# Patient Record
Sex: FEMALE | Race: WHITE | NOT HISPANIC OR LATINO | Employment: OTHER | URBAN - NONMETROPOLITAN AREA
[De-identification: names, ages, dates, MRNs, and addresses within clinical notes are randomized per-mention and may not be internally consistent; named-entity substitution may affect disease eponyms.]

---

## 2022-09-21 ENCOUNTER — HOSPITAL ENCOUNTER (OUTPATIENT)
Facility: HOSPITAL | Age: 37
Discharge: HOME | End: 2022-10-10
Attending: INTERNAL MEDICINE | Admitting: INTERNAL MEDICINE

## 2022-09-21 PROCEDURE — 87102 FUNGUS ISOLATION CULTURE: CPT | Performed by: INTERNAL MEDICINE

## 2022-09-21 RX ORDER — ACETAMINOPHEN 650 MG/1
650 SUPPOSITORY RECTAL EVERY 4 HOURS PRN
Status: DISCONTINUED | OUTPATIENT
Start: 2022-09-21 | End: 2022-10-10 | Stop reason: HOSPADM

## 2022-09-21 RX ORDER — SODIUM CHLORIDE 0.9 % (FLUSH) 0.9 %
10 SYRINGE (ML) INJECTION EVERY 12 HOURS SCHEDULED
Status: DISCONTINUED | OUTPATIENT
Start: 2022-09-21 | End: 2022-10-10 | Stop reason: HOSPADM

## 2022-09-21 RX ORDER — DIVALPROEX SODIUM 500 MG/1
2500 TABLET, EXTENDED RELEASE ORAL NIGHTLY
Status: DISCONTINUED | OUTPATIENT
Start: 2022-09-21 | End: 2022-10-10 | Stop reason: HOSPADM

## 2022-09-21 RX ORDER — ONDANSETRON 4 MG/1
4 TABLET, FILM COATED ORAL EVERY 6 HOURS PRN
Status: DISCONTINUED | OUTPATIENT
Start: 2022-09-21 | End: 2022-10-10 | Stop reason: HOSPADM

## 2022-09-21 RX ORDER — BENZTROPINE MESYLATE 0.5 MG/1
1 TABLET ORAL 3 TIMES DAILY
Status: DISCONTINUED | OUTPATIENT
Start: 2022-09-21 | End: 2022-10-10 | Stop reason: HOSPADM

## 2022-09-21 RX ORDER — DOCUSATE SODIUM 100 MG/1
100 CAPSULE, LIQUID FILLED ORAL 2 TIMES DAILY
Status: DISCONTINUED | OUTPATIENT
Start: 2022-09-21 | End: 2022-10-10 | Stop reason: HOSPADM

## 2022-09-21 RX ORDER — BENZONATATE 100 MG/1
100 CAPSULE ORAL 3 TIMES DAILY PRN
Status: DISCONTINUED | OUTPATIENT
Start: 2022-09-21 | End: 2022-10-10 | Stop reason: HOSPADM

## 2022-09-21 RX ORDER — PANTOPRAZOLE SODIUM 40 MG/1
40 TABLET, DELAYED RELEASE ORAL DAILY
Status: DISCONTINUED | OUTPATIENT
Start: 2022-09-22 | End: 2022-10-10 | Stop reason: HOSPADM

## 2022-09-21 RX ORDER — GUAIFENESIN 600 MG/1
1200 TABLET, EXTENDED RELEASE ORAL 2 TIMES DAILY PRN
Status: DISCONTINUED | OUTPATIENT
Start: 2022-09-21 | End: 2022-10-10 | Stop reason: HOSPADM

## 2022-09-21 RX ORDER — CLOZAPINE 25 MG/1
100 TABLET ORAL DAILY
Status: DISCONTINUED | OUTPATIENT
Start: 2022-09-22 | End: 2022-09-22

## 2022-09-21 RX ORDER — MELATONIN
2000 DAILY
Status: DISCONTINUED | OUTPATIENT
Start: 2022-09-22 | End: 2022-10-10 | Stop reason: HOSPADM

## 2022-09-21 RX ORDER — FUROSEMIDE 40 MG/1
40 TABLET ORAL DAILY
Status: DISCONTINUED | OUTPATIENT
Start: 2022-09-22 | End: 2022-10-10 | Stop reason: HOSPADM

## 2022-09-21 RX ORDER — SACCHAROMYCES BOULARDII 250 MG
250 CAPSULE ORAL 2 TIMES DAILY
Status: DISCONTINUED | OUTPATIENT
Start: 2022-09-21 | End: 2022-10-10 | Stop reason: HOSPADM

## 2022-09-21 RX ORDER — ENOXAPARIN SODIUM 100 MG/ML
40 INJECTION SUBCUTANEOUS EVERY 12 HOURS
Status: DISCONTINUED | OUTPATIENT
Start: 2022-09-22 | End: 2022-10-10 | Stop reason: HOSPADM

## 2022-09-21 RX ORDER — SODIUM CHLORIDE 0.9 % (FLUSH) 0.9 %
10 SYRINGE (ML) INJECTION AS NEEDED
Status: DISCONTINUED | OUTPATIENT
Start: 2022-09-21 | End: 2022-10-10 | Stop reason: HOSPADM

## 2022-09-21 RX ORDER — IPRATROPIUM BROMIDE AND ALBUTEROL SULFATE 2.5; .5 MG/3ML; MG/3ML
3 SOLUTION RESPIRATORY (INHALATION)
Status: DISCONTINUED | OUTPATIENT
Start: 2022-09-21 | End: 2022-10-08

## 2022-09-21 RX ORDER — ACETAMINOPHEN 325 MG/1
650 TABLET ORAL EVERY 4 HOURS PRN
Status: DISCONTINUED | OUTPATIENT
Start: 2022-09-21 | End: 2022-10-10 | Stop reason: HOSPADM

## 2022-09-21 RX ORDER — ONDANSETRON 2 MG/ML
4 INJECTION INTRAMUSCULAR; INTRAVENOUS EVERY 6 HOURS PRN
Status: DISCONTINUED | OUTPATIENT
Start: 2022-09-21 | End: 2022-10-10 | Stop reason: HOSPADM

## 2022-09-22 LAB
ALBUMIN SERPL-MCNC: 3.1 G/DL (ref 3.5–5.2)
ALBUMIN/GLOB SERPL: 1 G/DL
ALP SERPL-CCNC: 52 U/L (ref 39–117)
ALT SERPL W P-5'-P-CCNC: 10 U/L (ref 1–33)
ANION GAP SERPL CALCULATED.3IONS-SCNC: 7 MMOL/L (ref 5–15)
AST SERPL-CCNC: 12 U/L (ref 1–32)
BASOPHILS # BLD AUTO: 0.01 10*3/MM3 (ref 0–0.2)
BASOPHILS NFR BLD AUTO: 0.1 % (ref 0–1.5)
BILIRUB SERPL-MCNC: 0.3 MG/DL (ref 0–1.2)
BUN SERPL-MCNC: 13 MG/DL (ref 6–20)
BUN/CREAT SERPL: 23.6 (ref 7–25)
CALCIUM SPEC-SCNC: 8.8 MG/DL (ref 8.6–10.5)
CHLORIDE SERPL-SCNC: 99 MMOL/L (ref 98–107)
CO2 SERPL-SCNC: 36 MMOL/L (ref 22–29)
CREAT SERPL-MCNC: 0.55 MG/DL (ref 0.57–1)
DEPRECATED RDW RBC AUTO: 49.2 FL (ref 37–54)
EGFRCR SERPLBLD CKD-EPI 2021: 122.8 ML/MIN/1.73
EOSINOPHIL # BLD AUTO: 0.12 10*3/MM3 (ref 0–0.4)
EOSINOPHIL NFR BLD AUTO: 1.7 % (ref 0.3–6.2)
ERYTHROCYTE [DISTWIDTH] IN BLOOD BY AUTOMATED COUNT: 13.7 % (ref 12.3–15.4)
GLOBULIN UR ELPH-MCNC: 3 GM/DL
GLUCOSE SERPL-MCNC: 107 MG/DL (ref 65–99)
HCT VFR BLD AUTO: 40.8 % (ref 34–46.6)
HGB BLD-MCNC: 12.5 G/DL (ref 12–15.9)
IMM GRANULOCYTES # BLD AUTO: 0.03 10*3/MM3 (ref 0–0.05)
IMM GRANULOCYTES NFR BLD AUTO: 0.4 % (ref 0–0.5)
LYMPHOCYTES # BLD AUTO: 2.02 10*3/MM3 (ref 0.7–3.1)
LYMPHOCYTES NFR BLD AUTO: 28.4 % (ref 19.6–45.3)
MCH RBC QN AUTO: 29.9 PG (ref 26.6–33)
MCHC RBC AUTO-ENTMCNC: 30.6 G/DL (ref 31.5–35.7)
MCV RBC AUTO: 97.6 FL (ref 79–97)
MONOCYTES # BLD AUTO: 1.02 10*3/MM3 (ref 0.1–0.9)
MONOCYTES NFR BLD AUTO: 14.3 % (ref 5–12)
NEUTROPHILS NFR BLD AUTO: 3.92 10*3/MM3 (ref 1.7–7)
NEUTROPHILS NFR BLD AUTO: 55.1 % (ref 42.7–76)
NRBC BLD AUTO-RTO: 0 /100 WBC (ref 0–0.2)
PLATELET # BLD AUTO: 174 10*3/MM3 (ref 140–450)
PMV BLD AUTO: 9.5 FL (ref 6–12)
POTASSIUM SERPL-SCNC: 4.1 MMOL/L (ref 3.5–5.2)
PREALB SERPL-MCNC: 18.8 MG/DL (ref 20–40)
PROT SERPL-MCNC: 6.1 G/DL (ref 6–8.5)
RBC # BLD AUTO: 4.18 10*6/MM3 (ref 3.77–5.28)
SODIUM SERPL-SCNC: 142 MMOL/L (ref 136–145)
WBC NRBC COR # BLD: 7.12 10*3/MM3 (ref 3.4–10.8)

## 2022-09-22 PROCEDURE — 80053 COMPREHEN METABOLIC PANEL: CPT | Performed by: INTERNAL MEDICINE

## 2022-09-22 PROCEDURE — 97162 PT EVAL MOD COMPLEX 30 MIN: CPT | Performed by: PHYSICAL THERAPIST

## 2022-09-22 PROCEDURE — 97166 OT EVAL MOD COMPLEX 45 MIN: CPT | Performed by: OCCUPATIONAL THERAPIST

## 2022-09-22 PROCEDURE — 85025 COMPLETE CBC W/AUTO DIFF WBC: CPT | Performed by: INTERNAL MEDICINE

## 2022-09-22 PROCEDURE — 25010000002 ENOXAPARIN PER 10 MG: Performed by: INTERNAL MEDICINE

## 2022-09-22 PROCEDURE — 84134 ASSAY OF PREALBUMIN: CPT | Performed by: INTERNAL MEDICINE

## 2022-09-22 PROCEDURE — 92610 EVALUATE SWALLOWING FUNCTION: CPT

## 2022-09-22 RX ORDER — CLOZAPINE 25 MG/1
100 TABLET ORAL DAILY
Status: DISCONTINUED | OUTPATIENT
Start: 2022-09-22 | End: 2022-09-22

## 2022-09-22 RX ORDER — CLOZAPINE 25 MG/1
100 TABLET ORAL DAILY
Status: DISCONTINUED | OUTPATIENT
Start: 2022-09-22 | End: 2022-10-10 | Stop reason: HOSPADM

## 2022-09-23 PROCEDURE — 92523 SPEECH SOUND LANG COMPREHEN: CPT

## 2022-09-23 PROCEDURE — 97110 THERAPEUTIC EXERCISES: CPT

## 2022-09-23 PROCEDURE — 25010000002 ENOXAPARIN PER 10 MG: Performed by: INTERNAL MEDICINE

## 2022-09-23 PROCEDURE — 97116 GAIT TRAINING THERAPY: CPT

## 2022-09-23 PROCEDURE — 97530 THERAPEUTIC ACTIVITIES: CPT

## 2022-09-23 PROCEDURE — 97535 SELF CARE MNGMENT TRAINING: CPT

## 2022-09-23 RX ORDER — NICOTINE 21 MG/24HR
1 PATCH, TRANSDERMAL 24 HOURS TRANSDERMAL
Status: DISCONTINUED | OUTPATIENT
Start: 2022-09-23 | End: 2022-10-10 | Stop reason: HOSPADM

## 2022-09-24 PROCEDURE — 97116 GAIT TRAINING THERAPY: CPT

## 2022-09-24 PROCEDURE — 25010000002 ENOXAPARIN PER 10 MG: Performed by: INTERNAL MEDICINE

## 2022-09-25 PROCEDURE — 25010000002 ENOXAPARIN PER 10 MG: Performed by: INTERNAL MEDICINE

## 2022-09-26 LAB
ANION GAP SERPL CALCULATED.3IONS-SCNC: 6 MMOL/L (ref 5–15)
BUN SERPL-MCNC: 13 MG/DL (ref 6–20)
BUN/CREAT SERPL: 22.8 (ref 7–25)
CALCIUM SPEC-SCNC: 8.6 MG/DL (ref 8.6–10.5)
CHLORIDE SERPL-SCNC: 101 MMOL/L (ref 98–107)
CO2 SERPL-SCNC: 35 MMOL/L (ref 22–29)
CREAT SERPL-MCNC: 0.57 MG/DL (ref 0.57–1)
DEPRECATED RDW RBC AUTO: 48.7 FL (ref 37–54)
EGFRCR SERPLBLD CKD-EPI 2021: 121.7 ML/MIN/1.73
ERYTHROCYTE [DISTWIDTH] IN BLOOD BY AUTOMATED COUNT: 13.2 % (ref 12.3–15.4)
GLUCOSE SERPL-MCNC: 98 MG/DL (ref 65–99)
HCT VFR BLD AUTO: 40.5 % (ref 34–46.6)
HGB BLD-MCNC: 12.2 G/DL (ref 12–15.9)
MCH RBC QN AUTO: 30.2 PG (ref 26.6–33)
MCHC RBC AUTO-ENTMCNC: 30.1 G/DL (ref 31.5–35.7)
MCV RBC AUTO: 100.2 FL (ref 79–97)
PLATELET # BLD AUTO: 181 10*3/MM3 (ref 140–450)
PMV BLD AUTO: 8.9 FL (ref 6–12)
POTASSIUM SERPL-SCNC: 3.9 MMOL/L (ref 3.5–5.2)
RBC # BLD AUTO: 4.04 10*6/MM3 (ref 3.77–5.28)
SODIUM SERPL-SCNC: 142 MMOL/L (ref 136–145)
WBC NRBC COR # BLD: 5.7 10*3/MM3 (ref 3.4–10.8)

## 2022-09-26 PROCEDURE — 97110 THERAPEUTIC EXERCISES: CPT

## 2022-09-26 PROCEDURE — 25010000002 ENOXAPARIN PER 10 MG: Performed by: INTERNAL MEDICINE

## 2022-09-26 PROCEDURE — 97116 GAIT TRAINING THERAPY: CPT

## 2022-09-26 PROCEDURE — 80048 BASIC METABOLIC PNL TOTAL CA: CPT | Performed by: NURSE PRACTITIONER

## 2022-09-26 PROCEDURE — 85027 COMPLETE CBC AUTOMATED: CPT | Performed by: NURSE PRACTITIONER

## 2022-09-26 PROCEDURE — 97535 SELF CARE MNGMENT TRAINING: CPT

## 2022-09-27 PROCEDURE — 97116 GAIT TRAINING THERAPY: CPT

## 2022-09-27 PROCEDURE — 97535 SELF CARE MNGMENT TRAINING: CPT

## 2022-09-27 PROCEDURE — 97110 THERAPEUTIC EXERCISES: CPT

## 2022-09-27 PROCEDURE — 25010000002 ENOXAPARIN PER 10 MG: Performed by: INTERNAL MEDICINE

## 2022-09-28 LAB — BACTERIA ISLT: NORMAL

## 2022-09-28 PROCEDURE — 97116 GAIT TRAINING THERAPY: CPT

## 2022-09-28 PROCEDURE — 97530 THERAPEUTIC ACTIVITIES: CPT

## 2022-09-28 PROCEDURE — 25010000002 ENOXAPARIN PER 10 MG: Performed by: INTERNAL MEDICINE

## 2022-09-28 PROCEDURE — 97535 SELF CARE MNGMENT TRAINING: CPT

## 2022-09-29 LAB
ANION GAP SERPL CALCULATED.3IONS-SCNC: 7 MMOL/L (ref 5–15)
BASOPHILS # BLD AUTO: 0.04 10*3/MM3 (ref 0–0.2)
BASOPHILS NFR BLD AUTO: 0.5 % (ref 0–1.5)
BUN SERPL-MCNC: 11 MG/DL (ref 6–20)
BUN/CREAT SERPL: 16.7 (ref 7–25)
CALCIUM SPEC-SCNC: 8.9 MG/DL (ref 8.6–10.5)
CHLORIDE SERPL-SCNC: 98 MMOL/L (ref 98–107)
CO2 SERPL-SCNC: 34 MMOL/L (ref 22–29)
CREAT SERPL-MCNC: 0.66 MG/DL (ref 0.57–1)
DEPRECATED RDW RBC AUTO: 45.5 FL (ref 37–54)
EGFRCR SERPLBLD CKD-EPI 2021: 117.5 ML/MIN/1.73
EOSINOPHIL # BLD AUTO: 0.06 10*3/MM3 (ref 0–0.4)
EOSINOPHIL NFR BLD AUTO: 0.7 % (ref 0.3–6.2)
ERYTHROCYTE [DISTWIDTH] IN BLOOD BY AUTOMATED COUNT: 13.2 % (ref 12.3–15.4)
GLUCOSE SERPL-MCNC: 159 MG/DL (ref 65–99)
HCT VFR BLD AUTO: 36.9 % (ref 34–46.6)
HGB BLD-MCNC: 11.5 G/DL (ref 12–15.9)
IMM GRANULOCYTES # BLD AUTO: 0.05 10*3/MM3 (ref 0–0.05)
IMM GRANULOCYTES NFR BLD AUTO: 0.6 % (ref 0–0.5)
LYMPHOCYTES # BLD AUTO: 2.48 10*3/MM3 (ref 0.7–3.1)
LYMPHOCYTES NFR BLD AUTO: 28.7 % (ref 19.6–45.3)
MCH RBC QN AUTO: 29.5 PG (ref 26.6–33)
MCHC RBC AUTO-ENTMCNC: 31.2 G/DL (ref 31.5–35.7)
MCV RBC AUTO: 94.6 FL (ref 79–97)
MONOCYTES # BLD AUTO: 1.03 10*3/MM3 (ref 0.1–0.9)
MONOCYTES NFR BLD AUTO: 11.9 % (ref 5–12)
NEUTROPHILS NFR BLD AUTO: 4.97 10*3/MM3 (ref 1.7–7)
NEUTROPHILS NFR BLD AUTO: 57.6 % (ref 42.7–76)
NRBC BLD AUTO-RTO: 0 /100 WBC (ref 0–0.2)
PLATELET # BLD AUTO: 195 10*3/MM3 (ref 140–450)
PMV BLD AUTO: 8.8 FL (ref 6–12)
POTASSIUM SERPL-SCNC: 3.7 MMOL/L (ref 3.5–5.2)
RBC # BLD AUTO: 3.9 10*6/MM3 (ref 3.77–5.28)
SODIUM SERPL-SCNC: 139 MMOL/L (ref 136–145)
WBC NRBC COR # BLD: 8.63 10*3/MM3 (ref 3.4–10.8)

## 2022-09-29 PROCEDURE — 97110 THERAPEUTIC EXERCISES: CPT

## 2022-09-29 PROCEDURE — 97535 SELF CARE MNGMENT TRAINING: CPT

## 2022-09-29 PROCEDURE — 80048 BASIC METABOLIC PNL TOTAL CA: CPT | Performed by: INTERNAL MEDICINE

## 2022-09-29 PROCEDURE — 25010000002 ENOXAPARIN PER 10 MG: Performed by: INTERNAL MEDICINE

## 2022-09-29 PROCEDURE — 97116 GAIT TRAINING THERAPY: CPT

## 2022-09-29 PROCEDURE — 85025 COMPLETE CBC W/AUTO DIFF WBC: CPT | Performed by: INTERNAL MEDICINE

## 2022-09-30 PROCEDURE — 97110 THERAPEUTIC EXERCISES: CPT

## 2022-09-30 PROCEDURE — 25010000002 ENOXAPARIN PER 10 MG: Performed by: INTERNAL MEDICINE

## 2022-09-30 PROCEDURE — 97116 GAIT TRAINING THERAPY: CPT

## 2022-10-01 PROCEDURE — 25010000002 ENOXAPARIN PER 10 MG: Performed by: INTERNAL MEDICINE

## 2022-10-01 NOTE — PROGRESS NOTES
Pt had episode of emesis. States is feeling better now and declines antiemetic. PA at bedside for eval. Resp even and NL. Pt reports some scratchiness to throat but denies swollen feeling.      Zane Hall RN  07/23/19 2027 THOMAS Rao APRN        Internal Medicine Progress Note    10/1/2022   10:38 CDT    Name:  Galilea Suh  MRN:    [id number]     Acct:     [id number]   Room:  [address] [place] [address] Day: 0     Admit Date: 9/21/2022  4:28 PM  PCP: Provider, No Known    Subjective:     C/C: shortness of breath, anxiety    Interval History: Status:  stayed the same. Resting in bed. No family at bedside. Woke from sleep easily. Afebrile. 02 sats stable on room air. Progressing with therapies. Denies pain.  Anxious for discharge.     Review of Systems   Constitutional: Positive for malaise/fatigue. Negative for chills, decreased appetite, weight gain and weight loss.   HENT: Negative for congestion, ear discharge, hoarse voice and tinnitus.    Eyes: Negative for blurred vision, discharge, visual disturbance and visual halos.   Cardiovascular: Positive for dyspnea on exertion. Negative for chest pain, claudication, irregular heartbeat, leg swelling, orthopnea and paroxysmal nocturnal dyspnea.   Respiratory: Positive for cough and shortness of breath. Negative for sputum production and wheezing.    Endocrine: Negative for cold intolerance, heat intolerance and polyuria.   Hematologic/Lymphatic: Negative for adenopathy. Does not bruise/bleed easily.   Skin: Negative for dry skin, itching and suspicious lesions.   Musculoskeletal: Negative for arthritis, back pain, falls, joint pain, muscle weakness and myalgias.   Gastrointestinal: Negative for abdominal pain, constipation, diarrhea, dysphagia and hematemesis.   Genitourinary: Negative for bladder incontinence, dysuria and frequency.   Neurological: Positive for weakness. Negative for aphonia, disturbances in coordination and dizziness.   Psychiatric/Behavioral: Negative for altered mental status, depression, memory loss and substance abuse. The patient is nervous/anxious. The patient does not have insomnia.          Medications:     Allergies:   Allergies    Allergen Reactions   • Erythromycin Base Unknown - Low Severity       Current Meds:   Current Facility-Administered Medications:   •  acetaminophen (TYLENOL) tablet 650 mg, 650 mg, Oral, Q4H PRN **OR** acetaminophen (TYLENOL) suppository 650 mg, 650 mg, Rectal, Q4H PRN, Shaun Lucero MD  •  benzonatate (TESSALON) capsule 100 mg, 100 mg, Oral, TID PRN, Shaun Lucero MD  •  benztropine (COGENTIN) tablet 1 mg, 1 mg, Oral, TID, Shaun Lucero MD  •  cholecalciferol (VITAMIN D3) tablet 2,000 Units, 2,000 Units, Oral, Daily, Shaun Lucero MD  •  cloZAPine (CLOZARIL) tablet 100 mg, 100 mg, Oral, Daily, Shaun Lucero MD  •  divalproex (DEPAKOTE ER) 24 hr tablet 2,500 mg, 2,500 mg, Oral, Nightly, Shaun Lucero MD  •  docusate sodium (COLACE) capsule 100 mg, 100 mg, Oral, BID, Shaun Lucero MD  •  Enoxaparin Sodium (LOVENOX) syringe 40 mg, 40 mg, Subcutaneous, Q12H, Shaun Lucero MD  •  furosemide (LASIX) tablet 40 mg, 40 mg, Oral, Daily, Shaun Lucero MD  •  guaiFENesin (MUCINEX) 12 hr tablet 1,200 mg, 1,200 mg, Oral, BID PRN, Shaun Lucero MD  •  ipratropium-albuterol (DUO-NEB) nebulizer solution 3 mL, 3 mL, Nebulization, 4x Daily - RT, Shaun Lucero MD  •  nicotine (NICODERM CQ) 14 MG/24HR patch 1 patch, 1 patch, Transdermal, Q24H, Elyse Meeks APRN  •  ondansetron (ZOFRAN) injection 4 mg, 4 mg, Intravenous, Q6H PRN **OR** ondansetron (ZOFRAN) tablet 4 mg, 4 mg, Oral, Q6H PRN, Shaun Lucero MD  •  pantoprazole (PROTONIX) EC tablet 40 mg, 40 mg, Oral, Daily, Shaun Lucero MD  •  saccharomyces boulardii (FLORASTOR) capsule 250 mg, 250 mg, Oral, BID, Shaun Lucero MD  •  sodium chloride 0.9 % flush 10 mL, 10 mL, Intravenous, Q12H, Shaun Lucero MD  •  sodium chloride 0.9 % flush 10 mL, 10 mL, Intravenous, PRN, Shaun Lucero MD    Data:     Code Status:    There are no  "questions and answers to display.       No family history on file.    Social History     Socioeconomic History   • Marital status: Single       Vitals:  Ht 167.6 cm (66\")   Wt (!) 159 kg (351 lb 6.4 oz)   BMI 56.72 kg/m²   T 97.4 P 88 R 16 /57 Sp02 90% (room air)    I/O (24Hr):  No intake or output data in the 24 hours ending 10/01/22 1038    Labs and imaging:      No results found for this or any previous visit (from the past 12 hour(s)).        Physical Examination:        Physical Exam  Vitals and nursing note reviewed.   Constitutional:       Appearance: Normal appearance. She is obese.   HENT:      Head: Normocephalic and atraumatic.      Right Ear: External ear normal.      Left Ear: External ear normal.      Nose: Nose normal.      Mouth/Throat:      Mouth: Mucous membranes are dry.      Pharynx: Oropharynx is clear.   Eyes:      Extraocular Movements: Extraocular movements intact.      Conjunctiva/sclera: Conjunctivae normal.      Pupils: Pupils are equal, round, and reactive to light.   Cardiovascular:      Rate and Rhythm: Normal rate and regular rhythm.   Abdominal:      General: Bowel sounds are normal.      Palpations: Abdomen is soft.      Comments: obese   Musculoskeletal:         General: Normal range of motion.      Cervical back: Normal range of motion and neck supple.   Skin:     General: Skin is warm and dry.   Neurological:      Mental Status: She is oriented to person, place, and time.   Psychiatric:         Mood and Affect: Mood normal.         Behavior: Behavior normal.           Assessment:            * No active hospital problems. *    No past medical history on file.     Plan:        1. Acute hypoxic respiratory failure  2. Schizophrenia  3. Morbid obesity  4. GERD  5. Possible Prader-Willi syndrome  6. Suspect obesity hypoventilation syndrome  7. S/p rhinovirus  8. Tobacco abuse     Continue current treatment. Monitor counts. Increase activity. Labs Monday. Oxygen weaning as " tolerated. Encourage increased participation with therapies. Maintain patient safety. Discharge planning.       Electronically signed by KRISTINE Mae on 10/1/2022 at 10:38 CDT

## 2022-10-02 PROCEDURE — 25010000002 ENOXAPARIN PER 10 MG: Performed by: INTERNAL MEDICINE

## 2022-10-02 NOTE — PROGRESS NOTES
THOMAS Rao APRN        Internal Medicine Progress Note    10/2/2022   12:35 CDT    Name:  Galilea Suh  MRN:    [id number]     Acct:     [id number]   Room:  [address] [place] [address] Day: 0     Admit Date: 9/21/2022  4:28 PM  PCP: Provider, No Known    Subjective:     C/C: shortness of breath, anxiety    Interval History: Status:  stayed the same. Resting in bed. No family at bedside. Woke from sleep easily. Afebrile. 02 sats stable on room air. Progressing with therapies. Denies pain.  Anxious for discharge.     Review of Systems   Constitutional: Positive for malaise/fatigue. Negative for chills, decreased appetite, weight gain and weight loss.   HENT: Negative for congestion, ear discharge, hoarse voice and tinnitus.    Eyes: Negative for blurred vision, discharge, visual disturbance and visual halos.   Cardiovascular: Positive for dyspnea on exertion. Negative for chest pain, claudication, irregular heartbeat, leg swelling, orthopnea and paroxysmal nocturnal dyspnea.   Respiratory: Positive for cough and shortness of breath. Negative for sputum production and wheezing.    Endocrine: Negative for cold intolerance, heat intolerance and polyuria.   Hematologic/Lymphatic: Negative for adenopathy. Does not bruise/bleed easily.   Skin: Negative for dry skin, itching and suspicious lesions.   Musculoskeletal: Negative for arthritis, back pain, falls, joint pain, muscle weakness and myalgias.   Gastrointestinal: Negative for abdominal pain, constipation, diarrhea, dysphagia and hematemesis.   Genitourinary: Negative for bladder incontinence, dysuria and frequency.   Neurological: Positive for weakness. Negative for aphonia, disturbances in coordination and dizziness.   Psychiatric/Behavioral: Negative for altered mental status, depression, memory loss and substance abuse. The patient is nervous/anxious. The patient does not have insomnia.          Medications:     Allergies:   Allergies    Allergen Reactions   • Erythromycin Base Unknown - Low Severity       Current Meds:   Current Facility-Administered Medications:   •  acetaminophen (TYLENOL) tablet 650 mg, 650 mg, Oral, Q4H PRN **OR** acetaminophen (TYLENOL) suppository 650 mg, 650 mg, Rectal, Q4H PRN, Shaun Lucero MD  •  benzonatate (TESSALON) capsule 100 mg, 100 mg, Oral, TID PRN, Shaun Lucero MD  •  benztropine (COGENTIN) tablet 1 mg, 1 mg, Oral, TID, Shaun Lucero MD  •  cholecalciferol (VITAMIN D3) tablet 2,000 Units, 2,000 Units, Oral, Daily, Shaun Lucero MD  •  cloZAPine (CLOZARIL) tablet 100 mg, 100 mg, Oral, Daily, Shaun Lucero MD  •  divalproex (DEPAKOTE ER) 24 hr tablet 2,500 mg, 2,500 mg, Oral, Nightly, Shaun Lucero MD  •  docusate sodium (COLACE) capsule 100 mg, 100 mg, Oral, BID, Shaun Lucero MD  •  Enoxaparin Sodium (LOVENOX) syringe 40 mg, 40 mg, Subcutaneous, Q12H, Shaun Lucero MD  •  furosemide (LASIX) tablet 40 mg, 40 mg, Oral, Daily, Shaun Lucero MD  •  guaiFENesin (MUCINEX) 12 hr tablet 1,200 mg, 1,200 mg, Oral, BID PRN, Shaun Lucero MD  •  ipratropium-albuterol (DUO-NEB) nebulizer solution 3 mL, 3 mL, Nebulization, 4x Daily - RT, Shaun Lucero MD  •  nicotine (NICODERM CQ) 14 MG/24HR patch 1 patch, 1 patch, Transdermal, Q24H, Elyse Meeks APRN  •  ondansetron (ZOFRAN) injection 4 mg, 4 mg, Intravenous, Q6H PRN **OR** ondansetron (ZOFRAN) tablet 4 mg, 4 mg, Oral, Q6H PRN, Shaun Lucero MD  •  pantoprazole (PROTONIX) EC tablet 40 mg, 40 mg, Oral, Daily, Shaun Lucero MD  •  saccharomyces boulardii (FLORASTOR) capsule 250 mg, 250 mg, Oral, BID, Shaun Lucero MD  •  sodium chloride 0.9 % flush 10 mL, 10 mL, Intravenous, Q12H, Shaun Lucero MD  •  sodium chloride 0.9 % flush 10 mL, 10 mL, Intravenous, PRN, Shaun Lucero MD    Data:     Code Status:    There are no  "questions and answers to display.       No family history on file.    Social History     Socioeconomic History   • Marital status: Single       Vitals:  Ht 167.6 cm (66\")   Wt (!) 159 kg (351 lb 6.4 oz)   BMI 56.72 kg/m²   T 98.2 P 85 R 12 /68 Sp02 97% (room air/2L NC PRN)    I/O (24Hr):  No intake or output data in the 24 hours ending 10/02/22 1235    Labs and imaging:      No results found for this or any previous visit (from the past 12 hour(s)).        Physical Examination:        Physical Exam  Vitals and nursing note reviewed.   Constitutional:       Appearance: Normal appearance. She is obese.   HENT:      Head: Normocephalic and atraumatic.      Right Ear: External ear normal.      Left Ear: External ear normal.      Nose: Nose normal.      Mouth/Throat:      Mouth: Mucous membranes are dry.      Pharynx: Oropharynx is clear.   Eyes:      Extraocular Movements: Extraocular movements intact.      Conjunctiva/sclera: Conjunctivae normal.      Pupils: Pupils are equal, round, and reactive to light.   Cardiovascular:      Rate and Rhythm: Normal rate and regular rhythm.   Abdominal:      General: Bowel sounds are normal.      Palpations: Abdomen is soft.      Comments: obese   Musculoskeletal:         General: Normal range of motion.      Cervical back: Normal range of motion and neck supple.   Skin:     General: Skin is warm and dry.   Neurological:      Mental Status: She is oriented to person, place, and time.   Psychiatric:         Mood and Affect: Mood normal.         Behavior: Behavior normal.           Assessment:            * No active hospital problems. *    No past medical history on file.     Plan:        1. Acute hypoxic respiratory failure  2. Schizophrenia  3. Morbid obesity  4. GERD  5. Possible Prader-Willi syndrome  6. Suspect obesity hypoventilation syndrome  7. S/p rhinovirus  8. Tobacco abuse     Continue current treatment. Monitor counts. Increase activity. Labs Monday. Oxygen " weaning as tolerated. Encourage increased participation with therapies. Maintain patient safety. Discharge planning.       Electronically signed by KRISTINE Mae on 10/2/2022 at 12:35 CDT     I have discussed the care of Galilea Suh, including pertinent history and exam findings, with the nurse practitioner.    I have seen and examined the patient and the key elements of all parts of the encounter have been performed by me.  I agree with the assessment, plan and orders as documented by Abram CARMONA, after I modified the exam findings and the plan of treatments and the final version is my approved version of the assessment.        Electronically signed by Shaun Lucero MD on 10/2/2022 at 19:22 CDT

## 2022-10-03 LAB
ANION GAP SERPL CALCULATED.3IONS-SCNC: 7 MMOL/L (ref 5–15)
BASOPHILS # BLD AUTO: 0.06 10*3/MM3 (ref 0–0.2)
BASOPHILS NFR BLD AUTO: 0.7 % (ref 0–1.5)
BUN SERPL-MCNC: 11 MG/DL (ref 6–20)
BUN/CREAT SERPL: 15.5 (ref 7–25)
CALCIUM SPEC-SCNC: 8.8 MG/DL (ref 8.6–10.5)
CHLORIDE SERPL-SCNC: 98 MMOL/L (ref 98–107)
CO2 SERPL-SCNC: 35 MMOL/L (ref 22–29)
CREAT SERPL-MCNC: 0.71 MG/DL (ref 0.57–1)
DEPRECATED RDW RBC AUTO: 46.3 FL (ref 37–54)
EGFRCR SERPLBLD CKD-EPI 2021: 113.9 ML/MIN/1.73
EOSINOPHIL # BLD AUTO: 0.08 10*3/MM3 (ref 0–0.4)
EOSINOPHIL NFR BLD AUTO: 0.9 % (ref 0.3–6.2)
ERYTHROCYTE [DISTWIDTH] IN BLOOD BY AUTOMATED COUNT: 13.6 % (ref 12.3–15.4)
GLUCOSE SERPL-MCNC: 138 MG/DL (ref 65–99)
HCT VFR BLD AUTO: 37.4 % (ref 34–46.6)
HGB BLD-MCNC: 11.6 G/DL (ref 12–15.9)
IMM GRANULOCYTES # BLD AUTO: 0.18 10*3/MM3 (ref 0–0.05)
IMM GRANULOCYTES NFR BLD AUTO: 2 % (ref 0–0.5)
LYMPHOCYTES # BLD AUTO: 2.35 10*3/MM3 (ref 0.7–3.1)
LYMPHOCYTES NFR BLD AUTO: 25.7 % (ref 19.6–45.3)
MCH RBC QN AUTO: 29.4 PG (ref 26.6–33)
MCHC RBC AUTO-ENTMCNC: 31 G/DL (ref 31.5–35.7)
MCV RBC AUTO: 94.7 FL (ref 79–97)
MONOCYTES # BLD AUTO: 0.96 10*3/MM3 (ref 0.1–0.9)
MONOCYTES NFR BLD AUTO: 10.5 % (ref 5–12)
NEUTROPHILS NFR BLD AUTO: 5.51 10*3/MM3 (ref 1.7–7)
NEUTROPHILS NFR BLD AUTO: 60.2 % (ref 42.7–76)
NRBC BLD AUTO-RTO: 0 /100 WBC (ref 0–0.2)
PLATELET # BLD AUTO: 189 10*3/MM3 (ref 140–450)
PMV BLD AUTO: 8.6 FL (ref 6–12)
POTASSIUM SERPL-SCNC: 3.9 MMOL/L (ref 3.5–5.2)
RBC # BLD AUTO: 3.95 10*6/MM3 (ref 3.77–5.28)
SODIUM SERPL-SCNC: 140 MMOL/L (ref 136–145)
WBC NRBC COR # BLD: 9.14 10*3/MM3 (ref 3.4–10.8)

## 2022-10-03 PROCEDURE — 92507 TX SP LANG VOICE COMM INDIV: CPT

## 2022-10-03 PROCEDURE — 80048 BASIC METABOLIC PNL TOTAL CA: CPT | Performed by: NURSE PRACTITIONER

## 2022-10-03 PROCEDURE — 85025 COMPLETE CBC W/AUTO DIFF WBC: CPT | Performed by: INTERNAL MEDICINE

## 2022-10-03 PROCEDURE — 25010000002 ENOXAPARIN PER 10 MG: Performed by: INTERNAL MEDICINE

## 2022-10-03 NOTE — PROGRESS NOTES
THOMAS Rao APRN        Internal Medicine Progress Note    10/3/2022   10:02 CDT    Name:  Galilea Suh  MRN:    [id number]     Acct:     [id number]   Room:  [address] [place] [address] Day: 0     Admit Date: 9/21/2022  4:28 PM  PCP: Provider, No Known    Subjective:     C/C: shortness of breath, anxiety    Interval History: Status:  stayed the same. Resting in bed. No family at bedside. Woke from sleep. Afebrile. 02 sats stable on room air. Progressing with therapies. Denies pain.  Counts stable. Anxious for discharge.   Review of Systems   Constitutional: Positive for malaise/fatigue. Negative for chills, decreased appetite, weight gain and weight loss.   HENT: Negative for congestion, ear discharge, hoarse voice and tinnitus.    Eyes: Negative for blurred vision, discharge, visual disturbance and visual halos.   Cardiovascular: Positive for dyspnea on exertion. Negative for chest pain, claudication, irregular heartbeat, leg swelling, orthopnea and paroxysmal nocturnal dyspnea.   Respiratory: Positive for cough and shortness of breath. Negative for sputum production and wheezing.    Endocrine: Negative for cold intolerance, heat intolerance and polyuria.   Hematologic/Lymphatic: Negative for adenopathy. Does not bruise/bleed easily.   Skin: Negative for dry skin, itching and suspicious lesions.   Musculoskeletal: Negative for arthritis, back pain, falls, joint pain, muscle weakness and myalgias.   Gastrointestinal: Negative for abdominal pain, constipation, diarrhea, dysphagia and hematemesis.   Genitourinary: Negative for bladder incontinence, dysuria and frequency.   Neurological: Positive for weakness. Negative for aphonia, disturbances in coordination and dizziness.   Psychiatric/Behavioral: Negative for altered mental status, depression, memory loss and substance abuse. The patient is nervous/anxious. The patient does not have insomnia.          Medications:     Allergies:   Allergies    Allergen Reactions   • Erythromycin Base Unknown - Low Severity       Current Meds:   Current Facility-Administered Medications:   •  acetaminophen (TYLENOL) tablet 650 mg, 650 mg, Oral, Q4H PRN **OR** acetaminophen (TYLENOL) suppository 650 mg, 650 mg, Rectal, Q4H PRN, Shaun Lucero MD  •  benzonatate (TESSALON) capsule 100 mg, 100 mg, Oral, TID PRN, Shaun Lucero MD  •  benztropine (COGENTIN) tablet 1 mg, 1 mg, Oral, TID, Shaun Lucero MD  •  cholecalciferol (VITAMIN D3) tablet 2,000 Units, 2,000 Units, Oral, Daily, Shaun Lucero MD  •  cloZAPine (CLOZARIL) tablet 100 mg, 100 mg, Oral, Daily, Shaun Lucero MD  •  divalproex (DEPAKOTE ER) 24 hr tablet 2,500 mg, 2,500 mg, Oral, Nightly, Shaun Lucero MD  •  docusate sodium (COLACE) capsule 100 mg, 100 mg, Oral, BID, Shaun Lucero MD  •  Enoxaparin Sodium (LOVENOX) syringe 40 mg, 40 mg, Subcutaneous, Q12H, Shaun Lucero MD  •  furosemide (LASIX) tablet 40 mg, 40 mg, Oral, Daily, Shaun Lucero MD  •  guaiFENesin (MUCINEX) 12 hr tablet 1,200 mg, 1,200 mg, Oral, BID PRN, Shaun Lucero MD  •  ipratropium-albuterol (DUO-NEB) nebulizer solution 3 mL, 3 mL, Nebulization, 4x Daily - RT, Shaun Lucero MD  •  nicotine (NICODERM CQ) 14 MG/24HR patch 1 patch, 1 patch, Transdermal, Q24H, Elyse Meeks APRN  •  ondansetron (ZOFRAN) injection 4 mg, 4 mg, Intravenous, Q6H PRN **OR** ondansetron (ZOFRAN) tablet 4 mg, 4 mg, Oral, Q6H PRN, Shaun Lucero MD  •  pantoprazole (PROTONIX) EC tablet 40 mg, 40 mg, Oral, Daily, Shaun Lucero MD  •  saccharomyces boulardii (FLORASTOR) capsule 250 mg, 250 mg, Oral, BID, Shaun Lucero MD  •  sodium chloride 0.9 % flush 10 mL, 10 mL, Intravenous, Q12H, Shaun Lucero MD  •  sodium chloride 0.9 % flush 10 mL, 10 mL, Intravenous, PRN, Shaun Lucero MD    Data:     Code Status:    There are no  "questions and answers to display.       No family history on file.    Social History     Socioeconomic History   • Marital status: Single       Vitals:  Ht 167.6 cm (66\")   Wt (!) 158 kg (348 lb 11.2 oz)   BMI 56.28 kg/m²   T 97.9 P 81 R 12 /48 Sp02 91% (room air)          I/O (24Hr):  No intake or output data in the 24 hours ending 10/03/22 1002    Labs and imaging:      Recent Results (from the past 12 hour(s))   Basic Metabolic Panel    Collection Time: 10/03/22  3:07 AM    Specimen: Blood   Result Value Ref Range    Glucose 138 (H) 65 - 99 mg/dL    BUN 11 6 - 20 mg/dL    Creatinine 0.71 0.57 - 1.00 mg/dL    Sodium 140 136 - 145 mmol/L    Potassium 3.9 3.5 - 5.2 mmol/L    Chloride 98 98 - 107 mmol/L    CO2 35.0 (H) 22.0 - 29.0 mmol/L    Calcium 8.8 8.6 - 10.5 mg/dL    BUN/Creatinine Ratio 15.5 7.0 - 25.0    Anion Gap 7.0 5.0 - 15.0 mmol/L    eGFR 113.9 >60.0 mL/min/1.73   CBC Auto Differential    Collection Time: 10/03/22  3:07 AM    Specimen: Blood   Result Value Ref Range    WBC 9.14 3.40 - 10.80 10*3/mm3    RBC 3.95 3.77 - 5.28 10*6/mm3    Hemoglobin 11.6 (L) 12.0 - 15.9 g/dL    Hematocrit 37.4 34.0 - 46.6 %    MCV 94.7 79.0 - 97.0 fL    MCH 29.4 26.6 - 33.0 pg    MCHC 31.0 (L) 31.5 - 35.7 g/dL    RDW 13.6 12.3 - 15.4 %    RDW-SD 46.3 37.0 - 54.0 fl    MPV 8.6 6.0 - 12.0 fL    Platelets 189 140 - 450 10*3/mm3    Neutrophil % 60.2 42.7 - 76.0 %    Lymphocyte % 25.7 19.6 - 45.3 %    Monocyte % 10.5 5.0 - 12.0 %    Eosinophil % 0.9 0.3 - 6.2 %    Basophil % 0.7 0.0 - 1.5 %    Immature Grans % 2.0 (H) 0.0 - 0.5 %    Neutrophils, Absolute 5.51 1.70 - 7.00 10*3/mm3    Lymphocytes, Absolute 2.35 0.70 - 3.10 10*3/mm3    Monocytes, Absolute 0.96 (H) 0.10 - 0.90 10*3/mm3    Eosinophils, Absolute 0.08 0.00 - 0.40 10*3/mm3    Basophils, Absolute 0.06 0.00 - 0.20 10*3/mm3    Immature Grans, Absolute 0.18 (H) 0.00 - 0.05 10*3/mm3    nRBC 0.0 0.0 - 0.2 /100 WBC           Physical Examination:        Physical " Exam  Vitals and nursing note reviewed.   Constitutional:       Appearance: Normal appearance. She is obese.   HENT:      Head: Normocephalic and atraumatic.      Right Ear: External ear normal.      Left Ear: External ear normal.      Nose: Nose normal.      Mouth/Throat:      Mouth: Mucous membranes are dry.      Pharynx: Oropharynx is clear.   Eyes:      Extraocular Movements: Extraocular movements intact.      Conjunctiva/sclera: Conjunctivae normal.      Pupils: Pupils are equal, round, and reactive to light.   Cardiovascular:      Rate and Rhythm: Normal rate and regular rhythm.   Abdominal:      General: Bowel sounds are normal.      Palpations: Abdomen is soft.      Comments: obese   Musculoskeletal:         General: Normal range of motion.      Cervical back: Normal range of motion and neck supple.   Skin:     General: Skin is warm and dry.   Neurological:      Mental Status: She is oriented to person, place, and time.   Psychiatric:         Mood and Affect: Mood normal.         Behavior: Behavior normal.           Assessment:            * No active hospital problems. *    No past medical history on file.     Plan:        1. Acute hypoxic respiratory failure  2. Schizophrenia  3. Morbid obesity  4. GERD  5. Possible Prader-Willi syndrome  6. Suspect obesity hypoventilation syndrome  7. S/p rhinovirus  8. Tobacco abuse     Continue current treatment. Monitor counts. Increase activity. Labs Thursday. Oxygen weaning as tolerated. Encourage increased participation with therapies. Maintain patient safety. Discharge planning.       Electronically signed by KRISTINE Leiva on 10/3/2022 at 10:02 CDT

## 2022-10-04 PROCEDURE — 25010000002 ENOXAPARIN PER 10 MG: Performed by: INTERNAL MEDICINE

## 2022-10-04 NOTE — PROGRESS NOTES
Nic Lucero M.D.  KRISTINE Galvin        Internal Medicine Progress Note    10/4/2022   11:36 CDT    Name:  Galilea Suh  MRN:    [id number]     Acct:     [id number]   Room:  [address] [place] [address] Day: 0     Admit Date: 9/21/2022  4:28 PM  PCP: Provider, No Known    Subjective:     C/C: shortness of breath, anxiety    Interval History: Status:  stayed the same. Resting in bed. No family at bedside. Woke from sleep. Afebrile. 02 sats stable on room air. Drops to low to mid 80s with sleep off bipap/02.  Progressing with therapies. Denies pain.   Anxious for discharge.   Review of Systems   Constitutional: Positive for malaise/fatigue. Negative for chills, decreased appetite, weight gain and weight loss.   HENT: Negative for congestion, ear discharge, hoarse voice and tinnitus.    Eyes: Negative for blurred vision, discharge, visual disturbance and visual halos.   Cardiovascular: Positive for dyspnea on exertion. Negative for chest pain, claudication, irregular heartbeat, leg swelling, orthopnea and paroxysmal nocturnal dyspnea.   Respiratory: Positive for cough and shortness of breath. Negative for sputum production and wheezing.    Endocrine: Negative for cold intolerance, heat intolerance and polyuria.   Hematologic/Lymphatic: Negative for adenopathy. Does not bruise/bleed easily.   Skin: Negative for dry skin, itching and suspicious lesions.   Musculoskeletal: Negative for arthritis, back pain, falls, joint pain, muscle weakness and myalgias.   Gastrointestinal: Negative for abdominal pain, constipation, diarrhea, dysphagia and hematemesis.   Genitourinary: Negative for bladder incontinence, dysuria and frequency.   Neurological: Positive for weakness. Negative for aphonia, disturbances in coordination and dizziness.   Psychiatric/Behavioral: Negative for altered mental status, depression, memory loss and substance abuse. The patient is nervous/anxious. The patient does not have insomnia.           Medications:     Allergies:   Allergies   Allergen Reactions   • Erythromycin Base Unknown - Low Severity       Current Meds:   Current Facility-Administered Medications:   •  acetaminophen (TYLENOL) tablet 650 mg, 650 mg, Oral, Q4H PRN **OR** acetaminophen (TYLENOL) suppository 650 mg, 650 mg, Rectal, Q4H PRN, Shaun Lucero MD  •  benzonatate (TESSALON) capsule 100 mg, 100 mg, Oral, TID PRN, Shaun Lucero MD  •  benztropine (COGENTIN) tablet 1 mg, 1 mg, Oral, TID, Shaun Lucero MD  •  cholecalciferol (VITAMIN D3) tablet 2,000 Units, 2,000 Units, Oral, Daily, Shaun Lucero MD  •  cloZAPine (CLOZARIL) tablet 100 mg, 100 mg, Oral, Daily, Shaun Lucero MD  •  divalproex (DEPAKOTE ER) 24 hr tablet 2,500 mg, 2,500 mg, Oral, Nightly, Shaun Lucero MD  •  docusate sodium (COLACE) capsule 100 mg, 100 mg, Oral, BID, Shaun Lucero MD  •  Enoxaparin Sodium (LOVENOX) syringe 40 mg, 40 mg, Subcutaneous, Q12H, Shaun Lucero MD  •  furosemide (LASIX) tablet 40 mg, 40 mg, Oral, Daily, Shaun Lucero MD  •  guaiFENesin (MUCINEX) 12 hr tablet 1,200 mg, 1,200 mg, Oral, BID PRN, Shaun Lucero MD  •  ipratropium-albuterol (DUO-NEB) nebulizer solution 3 mL, 3 mL, Nebulization, 4x Daily - RT, Shaun Lucero MD  •  nicotine (NICODERM CQ) 14 MG/24HR patch 1 patch, 1 patch, Transdermal, Q24H, Elyse Meeks APRN  •  ondansetron (ZOFRAN) injection 4 mg, 4 mg, Intravenous, Q6H PRN **OR** ondansetron (ZOFRAN) tablet 4 mg, 4 mg, Oral, Q6H PRN, Shaun Lucero MD  •  pantoprazole (PROTONIX) EC tablet 40 mg, 40 mg, Oral, Daily, Shaun Lucero MD  •  saccharomyces boulardii (FLORASTOR) capsule 250 mg, 250 mg, Oral, BID, Shaun Luceor MD  •  sodium chloride 0.9 % flush 10 mL, 10 mL, Intravenous, Q12H, Shaun Lucero MD  •  sodium chloride 0.9 % flush 10 mL, 10 mL, Intravenous, PRN, Shaun Lucero,  "MD    Data:     Code Status:    There are no questions and answers to display.       No family history on file.    Social History     Socioeconomic History   • Marital status: Single       Vitals:  Ht 167.6 cm (66\")   Wt (!) 158 kg (348 lb 11.2 oz)   BMI 56.28 kg/m²   T 97.9 P 78 R 20 /59 Sp02 94% (room air)          I/O (24Hr):  No intake or output data in the 24 hours ending 10/04/22 1136    Labs and imaging:      No results found for this or any previous visit (from the past 12 hour(s)).        Physical Examination:        Physical Exam  Vitals and nursing note reviewed.   Constitutional:       Appearance: Normal appearance. She is obese.   HENT:      Head: Normocephalic and atraumatic.      Right Ear: External ear normal.      Left Ear: External ear normal.      Nose: Nose normal.      Mouth/Throat:      Mouth: Mucous membranes are dry.      Pharynx: Oropharynx is clear.   Eyes:      Extraocular Movements: Extraocular movements intact.      Conjunctiva/sclera: Conjunctivae normal.      Pupils: Pupils are equal, round, and reactive to light.   Cardiovascular:      Rate and Rhythm: Normal rate and regular rhythm.   Abdominal:      General: Bowel sounds are normal.      Palpations: Abdomen is soft.      Comments: obese   Musculoskeletal:         General: Normal range of motion.      Cervical back: Normal range of motion and neck supple.   Skin:     General: Skin is warm and dry.   Neurological:      Mental Status: She is oriented to person, place, and time.   Psychiatric:         Mood and Affect: Mood normal.         Behavior: Behavior normal.           Assessment:            * No active hospital problems. *    No past medical history on file.     Plan:        1. Acute hypoxic respiratory failure  2. Schizophrenia  3. Morbid obesity  4. GERD  5. Possible Prader-Willi syndrome  6. Suspect obesity hypoventilation syndrome  7. S/p rhinovirus  8. Tobacco abuse     Continue current treatment. Monitor counts. " Increase activity. Labs Thursday. Oxygen weaning as tolerated. Encourage increased participation with therapies. Maintain patient safety. Discharge planning.       Electronically signed by KRISTINE Leiva on 10/4/2022 at 11:36 CDT     I have discussed the care of Galilea Suh, including pertinent history and exam findings, with the nurse practitioner.    I have seen and examined the patient and the key elements of all parts of the encounter have been performed by me.  I agree with the assessment, plan and orders as documented by KRISTINE Galvin, after I modified the exam findings and the plan of treatments and the final version is my approved version of the assessment.        Electronically signed by Shaun Lucero MD on 10/4/2022 at 18:41 CDT

## 2022-10-04 NOTE — PROGRESS NOTES
[facility] Fall Risk Assessment Note    Name: Galilea Suh  MRN: [id number]  CSN: [id number]  Room: [facility]  Admit Date/Time: 9/21/2022  4:28 PM.    Currently ordered medications associated with an increased risk for fall include:    Scheduled Meds:  benztropine, 1 mg, Oral, TID  cloZAPine, 100 mg, Oral, Daily  divalproex, 2,500 mg, Oral, Nightly  docusate sodium, 100 mg, Oral, BID  furosemide, 40 mg, Oral, Daily    Continuous Infusions: None    PRN Meds:  •  benzonatate  •  guaiFENesin  •  Ondansetron IV **OR** ondansetron PO      Carol Hunt, PharmKEYSHAWN  10/04/22 13:27 CDT

## 2022-10-05 PROCEDURE — 25010000002 ENOXAPARIN PER 10 MG: Performed by: INTERNAL MEDICINE

## 2022-10-05 NOTE — PROGRESS NOTES
Nic Lucero M.D.  KRISTINE Galvin        Internal Medicine Progress Note    10/5/2022   09:46 CDT    Name:  Galilea Suh  MRN:    [id number]     Acct:     [id number]   Room:  [address] [place] [address] Day: 0     Admit Date: 9/21/2022  4:28 PM  PCP: Provider, No Known    Subjective:     C/C: shortness of breath, anxiety    Interval History: Status:  stayed the same. Resting in bed. No family at bedside. Woke from sleep. Afebrile. 02 sats high 90s with bipap with sleep.  Drops to low to mid 80s with sleep off bipap/02.  Progressing with therapies. Denies pain.  Anxious for discharge.   Review of Systems   Constitutional: Positive for malaise/fatigue. Negative for chills, decreased appetite, weight gain and weight loss.   HENT: Negative for congestion, ear discharge, hoarse voice and tinnitus.    Eyes: Negative for blurred vision, discharge, visual disturbance and visual halos.   Cardiovascular: Positive for dyspnea on exertion. Negative for chest pain, claudication, irregular heartbeat, leg swelling, orthopnea and paroxysmal nocturnal dyspnea.   Respiratory: Positive for cough and shortness of breath. Negative for sputum production and wheezing.    Endocrine: Negative for cold intolerance, heat intolerance and polyuria.   Hematologic/Lymphatic: Negative for adenopathy. Does not bruise/bleed easily.   Skin: Negative for dry skin, itching and suspicious lesions.   Musculoskeletal: Negative for arthritis, back pain, falls, joint pain, muscle weakness and myalgias.   Gastrointestinal: Negative for abdominal pain, constipation, diarrhea, dysphagia and hematemesis.   Genitourinary: Negative for bladder incontinence, dysuria and frequency.   Neurological: Positive for weakness. Negative for aphonia, disturbances in coordination and dizziness.   Psychiatric/Behavioral: Negative for altered mental status, depression, memory loss and substance abuse. The patient is nervous/anxious. The patient does not have insomnia.           Medications:     Allergies:   Allergies   Allergen Reactions   • Erythromycin Base Unknown - Low Severity       Current Meds:   Current Facility-Administered Medications:   •  acetaminophen (TYLENOL) tablet 650 mg, 650 mg, Oral, Q4H PRN **OR** acetaminophen (TYLENOL) suppository 650 mg, 650 mg, Rectal, Q4H PRN, Shaun Lucero MD  •  benzonatate (TESSALON) capsule 100 mg, 100 mg, Oral, TID PRN, Shaun Lucero MD  •  benztropine (COGENTIN) tablet 1 mg, 1 mg, Oral, TID, Shaun Lucero MD  •  cholecalciferol (VITAMIN D3) tablet 2,000 Units, 2,000 Units, Oral, Daily, Shaun Lucero MD  •  cloZAPine (CLOZARIL) tablet 100 mg, 100 mg, Oral, Daily, Shaun Lucero MD  •  divalproex (DEPAKOTE ER) 24 hr tablet 2,500 mg, 2,500 mg, Oral, Nightly, Shaun Lucero MD  •  docusate sodium (COLACE) capsule 100 mg, 100 mg, Oral, BID, Shaun Lucero MD  •  Enoxaparin Sodium (LOVENOX) syringe 40 mg, 40 mg, Subcutaneous, Q12H, Shaun Lucero MD  •  furosemide (LASIX) tablet 40 mg, 40 mg, Oral, Daily, Shaun Lucero MD  •  guaiFENesin (MUCINEX) 12 hr tablet 1,200 mg, 1,200 mg, Oral, BID PRN, Shaun Lucero MD  •  ipratropium-albuterol (DUO-NEB) nebulizer solution 3 mL, 3 mL, Nebulization, 4x Daily - RT, Shaun Lucero MD  •  nicotine (NICODERM CQ) 14 MG/24HR patch 1 patch, 1 patch, Transdermal, Q24H, Elyse Meeks APRN  •  ondansetron (ZOFRAN) injection 4 mg, 4 mg, Intravenous, Q6H PRN **OR** ondansetron (ZOFRAN) tablet 4 mg, 4 mg, Oral, Q6H PRN, Shaun Lucero MD  •  pantoprazole (PROTONIX) EC tablet 40 mg, 40 mg, Oral, Daily, Shaun Lucero MD  •  saccharomyces boulardii (FLORASTOR) capsule 250 mg, 250 mg, Oral, BID, Shaun Lucero MD  •  sodium chloride 0.9 % flush 10 mL, 10 mL, Intravenous, Q12H, Shaun Lucero MD  •  sodium chloride 0.9 % flush 10 mL, 10 mL, Intravenous, PRN, Shaun Lucero,  "MD    Data:     Code Status:    There are no questions and answers to display.       No family history on file.    Social History     Socioeconomic History   • Marital status: Single       Vitals:  Ht 167.6 cm (66\")   Wt (!) 158 kg (348 lb 11.2 oz)   BMI 56.28 kg/m²   T 97.6 P 70 R 16 /57 Sp02 95% (bipap)          I/O (24Hr):  No intake or output data in the 24 hours ending 10/05/22 0946    Labs and imaging:      No results found for this or any previous visit (from the past 12 hour(s)).        Physical Examination:        Physical Exam  Vitals and nursing note reviewed.   Constitutional:       Appearance: Normal appearance. She is obese.   HENT:      Head: Normocephalic and atraumatic.      Right Ear: External ear normal.      Left Ear: External ear normal.      Nose: Nose normal.      Mouth/Throat:      Mouth: Mucous membranes are dry.      Pharynx: Oropharynx is clear.   Eyes:      Extraocular Movements: Extraocular movements intact.      Conjunctiva/sclera: Conjunctivae normal.      Pupils: Pupils are equal, round, and reactive to light.   Cardiovascular:      Rate and Rhythm: Normal rate and regular rhythm.   Abdominal:      General: Bowel sounds are normal.      Palpations: Abdomen is soft.      Comments: obese   Musculoskeletal:         General: Normal range of motion.      Cervical back: Normal range of motion and neck supple.   Skin:     General: Skin is warm and dry.   Neurological:      Mental Status: She is oriented to person, place, and time.   Psychiatric:         Mood and Affect: Mood normal.         Behavior: Behavior normal.           Assessment:            * No active hospital problems. *    No past medical history on file.     Plan:        1. Acute hypoxic respiratory failure  2. Schizophrenia  3. Morbid obesity  4. GERD  5. Possible Prader-Willi syndrome  6. Suspect obesity hypoventilation syndrome  7. S/p rhinovirus  8. Tobacco abuse     Continue current treatment. Monitor counts. " Increase activity. Labs in am. Oxygen weaning as tolerated. Encourage increased participation with therapies. Maintain patient safety. Discharge planning.       Electronically signed by KRISTINE Leiva on 10/5/2022 at 09:46 CDT     I have discussed the care of Galilea Suh, including pertinent history and exam findings, with the nurse practitioner.    I have seen and examined the patient and the key elements of all parts of the encounter have been performed by me.  I agree with the assessment, plan and orders as documented by KRISTINE Galvin, after I modified the exam findings and the plan of treatments and the final version is my approved version of the assessment.        Electronically signed by Shaun Lucero MD on 10/5/2022 at 11:58 CDT

## 2022-10-06 LAB
ARTERIAL PATENCY WRIST A: ABNORMAL
ATMOSPHERIC PRESS: 752 MMHG
BASE EXCESS BLDA CALC-SCNC: 7.6 MMOL/L (ref 0–2)
BASOPHILS # BLD AUTO: 0.06 10*3/MM3 (ref 0–0.2)
BASOPHILS NFR BLD AUTO: 0.8 % (ref 0–1.5)
BDY SITE: ABNORMAL
BODY TEMPERATURE: 37 C
CPAP: 10 CMH2O
DEPRECATED RDW RBC AUTO: 48 FL (ref 37–54)
EOSINOPHIL # BLD AUTO: 0.08 10*3/MM3 (ref 0–0.4)
EOSINOPHIL NFR BLD AUTO: 1 % (ref 0.3–6.2)
ERYTHROCYTE [DISTWIDTH] IN BLOOD BY AUTOMATED COUNT: 13.7 % (ref 12.3–15.4)
HCO3 BLDA-SCNC: 34.5 MMOL/L (ref 20–26)
HCT VFR BLD AUTO: 38.4 % (ref 34–46.6)
HGB BLD-MCNC: 11.7 G/DL (ref 12–15.9)
IMM GRANULOCYTES # BLD AUTO: 0.12 10*3/MM3 (ref 0–0.05)
IMM GRANULOCYTES NFR BLD AUTO: 1.5 % (ref 0–0.5)
INHALED O2 CONCENTRATION: 30 %
LYMPHOCYTES # BLD AUTO: 2.4 10*3/MM3 (ref 0.7–3.1)
LYMPHOCYTES NFR BLD AUTO: 30.8 % (ref 19.6–45.3)
Lab: ABNORMAL
MCH RBC QN AUTO: 29.2 PG (ref 26.6–33)
MCHC RBC AUTO-ENTMCNC: 30.5 G/DL (ref 31.5–35.7)
MCV RBC AUTO: 95.8 FL (ref 79–97)
MODALITY: ABNORMAL
MONOCYTES # BLD AUTO: 0.91 10*3/MM3 (ref 0.1–0.9)
MONOCYTES NFR BLD AUTO: 11.7 % (ref 5–12)
NEUTROPHILS NFR BLD AUTO: 4.22 10*3/MM3 (ref 1.7–7)
NEUTROPHILS NFR BLD AUTO: 54.2 % (ref 42.7–76)
NRBC BLD AUTO-RTO: 0 /100 WBC (ref 0–0.2)
PCO2 BLDA: 58.5 MM HG (ref 35–45)
PCO2 TEMP ADJ BLD: 58.5 MM HG (ref 35–45)
PH BLDA: 7.38 PH UNITS (ref 7.35–7.45)
PH, TEMP CORRECTED: 7.38 PH UNITS (ref 7.35–7.45)
PLATELET # BLD AUTO: 219 10*3/MM3 (ref 140–450)
PMV BLD AUTO: 8.9 FL (ref 6–12)
PO2 BLDA: 73.5 MM HG (ref 83–108)
PO2 TEMP ADJ BLD: 73.5 MM HG (ref 83–108)
RBC # BLD AUTO: 4.01 10*6/MM3 (ref 3.77–5.28)
SAO2 % BLDCOA: 94.3 % (ref 94–99)
VENTILATOR MODE: ABNORMAL
WBC NRBC COR # BLD: 7.79 10*3/MM3 (ref 3.4–10.8)

## 2022-10-06 PROCEDURE — 25010000002 ENOXAPARIN PER 10 MG: Performed by: INTERNAL MEDICINE

## 2022-10-06 PROCEDURE — 82803 BLOOD GASES ANY COMBINATION: CPT

## 2022-10-06 PROCEDURE — 85025 COMPLETE CBC W/AUTO DIFF WBC: CPT | Performed by: INTERNAL MEDICINE

## 2022-10-06 NOTE — PROGRESS NOTES
Nic Lucero M.D.  KRISTINE Galvin        Internal Medicine Progress Note    10/6/2022   14:19 CDT    Name:  Galilea Suh  MRN:    [id number]     Acct:     [id number]   Room:  [address] [place] [address] Day: 0     Admit Date: 9/21/2022  4:28 PM  PCP: Provider, No Known    Subjective:     C/C: shortness of breath, anxiety    Interval History: Status:  stayed the same. Resting in bed. No family at bedside. Afebrile. 02 sats low 90s at rest on room air.  Drops to low to mid 80s with sleep off bipap/02.  Progressing with therapies. Denies pain.  Anxious for discharge.   Review of Systems   Constitutional: Positive for malaise/fatigue. Negative for chills, decreased appetite, weight gain and weight loss.   HENT: Negative for congestion, ear discharge, hoarse voice and tinnitus.    Eyes: Negative for blurred vision, discharge, visual disturbance and visual halos.   Cardiovascular: Positive for dyspnea on exertion. Negative for chest pain, claudication, irregular heartbeat, leg swelling, orthopnea and paroxysmal nocturnal dyspnea.   Respiratory: Positive for cough and shortness of breath. Negative for sputum production and wheezing.    Endocrine: Negative for cold intolerance, heat intolerance and polyuria.   Hematologic/Lymphatic: Negative for adenopathy. Does not bruise/bleed easily.   Skin: Negative for dry skin, itching and suspicious lesions.   Musculoskeletal: Negative for arthritis, back pain, falls, joint pain, muscle weakness and myalgias.   Gastrointestinal: Negative for abdominal pain, constipation, diarrhea, dysphagia and hematemesis.   Genitourinary: Negative for bladder incontinence, dysuria and frequency.   Neurological: Positive for weakness. Negative for aphonia, disturbances in coordination and dizziness.   Psychiatric/Behavioral: Negative for altered mental status, depression, memory loss and substance abuse. The patient is nervous/anxious. The patient does not have insomnia.           Medications:     Allergies:   Allergies   Allergen Reactions   • Erythromycin Base Unknown - Low Severity       Current Meds:   Current Facility-Administered Medications:   •  acetaminophen (TYLENOL) tablet 650 mg, 650 mg, Oral, Q4H PRN **OR** acetaminophen (TYLENOL) suppository 650 mg, 650 mg, Rectal, Q4H PRN, Shaun Lucero MD  •  benzonatate (TESSALON) capsule 100 mg, 100 mg, Oral, TID PRN, Shanu Lucero MD  •  benztropine (COGENTIN) tablet 1 mg, 1 mg, Oral, TID, Shaun Lucero MD  •  cholecalciferol (VITAMIN D3) tablet 2,000 Units, 2,000 Units, Oral, Daily, Shaun Lucero MD  •  cloZAPine (CLOZARIL) tablet 100 mg, 100 mg, Oral, Daily, Shaun Lucero MD  •  divalproex (DEPAKOTE ER) 24 hr tablet 2,500 mg, 2,500 mg, Oral, Nightly, Shaun Lucero MD  •  docusate sodium (COLACE) capsule 100 mg, 100 mg, Oral, BID, Shaun Lucero MD  •  Enoxaparin Sodium (LOVENOX) syringe 40 mg, 40 mg, Subcutaneous, Q12H, Shaun Lucero MD  •  furosemide (LASIX) tablet 40 mg, 40 mg, Oral, Daily, Shaun Lucero MD  •  guaiFENesin (MUCINEX) 12 hr tablet 1,200 mg, 1,200 mg, Oral, BID PRN, Shaun Lucero MD  •  ipratropium-albuterol (DUO-NEB) nebulizer solution 3 mL, 3 mL, Nebulization, 4x Daily - RT, Shaun Lucero MD  •  nicotine (NICODERM CQ) 14 MG/24HR patch 1 patch, 1 patch, Transdermal, Q24H, Elyse Meeks APRN  •  ondansetron (ZOFRAN) injection 4 mg, 4 mg, Intravenous, Q6H PRN **OR** ondansetron (ZOFRAN) tablet 4 mg, 4 mg, Oral, Q6H PRN, Shaun Lucero MD  •  pantoprazole (PROTONIX) EC tablet 40 mg, 40 mg, Oral, Daily, Shaun Lucero MD  •  saccharomyces boulardii (FLORASTOR) capsule 250 mg, 250 mg, Oral, BID, Shaun Lucero MD  •  sodium chloride 0.9 % flush 10 mL, 10 mL, Intravenous, Q12H, Shaun Lucero MD  •  sodium chloride 0.9 % flush 10 mL, 10 mL, Intravenous, PRN, Shaun Lucero,  "MD    Data:     Code Status:    There are no questions and answers to display.       No family history on file.    Social History     Socioeconomic History   • Marital status: Single       Vitals:  Ht 167.6 cm (66\")   Wt (!) 158 kg (348 lb 11.2 oz)   BMI 56.28 kg/m²   T 97.8 P 79 R 12 /66 Sp02 93% (room air)          I/O (24Hr):  No intake or output data in the 24 hours ending 10/06/22 1419    Labs and imaging:      Recent Results (from the past 12 hour(s))   CBC Auto Differential    Collection Time: 10/06/22  3:53 AM    Specimen: Blood   Result Value Ref Range    WBC 7.79 3.40 - 10.80 10*3/mm3    RBC 4.01 3.77 - 5.28 10*6/mm3    Hemoglobin 11.7 (L) 12.0 - 15.9 g/dL    Hematocrit 38.4 34.0 - 46.6 %    MCV 95.8 79.0 - 97.0 fL    MCH 29.2 26.6 - 33.0 pg    MCHC 30.5 (L) 31.5 - 35.7 g/dL    RDW 13.7 12.3 - 15.4 %    RDW-SD 48.0 37.0 - 54.0 fl    MPV 8.9 6.0 - 12.0 fL    Platelets 219 140 - 450 10*3/mm3    Neutrophil % 54.2 42.7 - 76.0 %    Lymphocyte % 30.8 19.6 - 45.3 %    Monocyte % 11.7 5.0 - 12.0 %    Eosinophil % 1.0 0.3 - 6.2 %    Basophil % 0.8 0.0 - 1.5 %    Immature Grans % 1.5 (H) 0.0 - 0.5 %    Neutrophils, Absolute 4.22 1.70 - 7.00 10*3/mm3    Lymphocytes, Absolute 2.40 0.70 - 3.10 10*3/mm3    Monocytes, Absolute 0.91 (H) 0.10 - 0.90 10*3/mm3    Eosinophils, Absolute 0.08 0.00 - 0.40 10*3/mm3    Basophils, Absolute 0.06 0.00 - 0.20 10*3/mm3    Immature Grans, Absolute 0.12 (H) 0.00 - 0.05 10*3/mm3    nRBC 0.0 0.0 - 0.2 /100 WBC   Blood Gas, Arterial -    Collection Time: 10/06/22  4:49 AM    Specimen: Arterial Blood   Result Value Ref Range    Site Left Radial     James's Test N/A     pH, Arterial 7.380 7.350 - 7.450 pH units    pCO2, Arterial 58.5 (H) 35.0 - 45.0 mm Hg    pO2, Arterial 73.5 (L) 83.0 - 108.0 mm Hg    HCO3, Arterial 34.5 (H) 20.0 - 26.0 mmol/L    Base Excess, Arterial 7.6 (H) 0.0 - 2.0 mmol/L    O2 Saturation, Arterial 94.3 94.0 - 99.0 %    Temperature 37.0 C    Barometric Pressure " for Blood Gas 752 mmHg    Modality CPAP     FIO2 30 %    Ventilator Mode NA     CPAP 10.0 cmH2O    Collected by CURT JUSTIN RRT     pCO2, Temperature Corrected 58.5 (H) 35 - 45 mm Hg    pH, Temp Corrected 7.380 7.350 - 7.450 pH Units    pO2, Temperature Corrected 73.5 (L) 83 - 108 mm Hg           Physical Examination:        Physical Exam  Vitals and nursing note reviewed.   Constitutional:       Appearance: Normal appearance. She is obese.   HENT:      Head: Normocephalic and atraumatic.      Right Ear: External ear normal.      Left Ear: External ear normal.      Nose: Nose normal.      Mouth/Throat:      Mouth: Mucous membranes are dry.      Pharynx: Oropharynx is clear.   Eyes:      Extraocular Movements: Extraocular movements intact.      Conjunctiva/sclera: Conjunctivae normal.      Pupils: Pupils are equal, round, and reactive to light.   Cardiovascular:      Rate and Rhythm: Normal rate and regular rhythm.   Abdominal:      General: Bowel sounds are normal.      Palpations: Abdomen is soft.      Comments: obese   Musculoskeletal:         General: Normal range of motion.      Cervical back: Normal range of motion and neck supple.   Skin:     General: Skin is warm and dry.   Neurological:      Mental Status: She is oriented to person, place, and time.   Psychiatric:         Mood and Affect: Mood normal.         Behavior: Behavior normal.           Assessment:            * No active hospital problems. *    No past medical history on file.     Plan:        1. Acute hypoxic respiratory failure  2. Schizophrenia  3. Morbid obesity  4. GERD  5. Possible Prader-Willi syndrome  6. Suspect obesity hypoventilation syndrome  7. S/p rhinovirus  8. Tobacco abuse     Continue current treatment. Monitor counts. Increase activity. Labs Monday. Oxygen weaning as tolerated. Encourage increased participation with therapies. Maintain patient safety. Discharge planning.       Electronically signed by KRISTINE Leiva on  10/6/2022 at 14:19 CDT   I have discussed the care of Galilea Suh, including pertinent history and exam findings, with the nurse practitioner.    I have seen and examined the patient and the key elements of all parts of the encounter have been performed by me.  I agree with the assessment, plan and orders as documented by KRISTINE Galvin, after I modified the exam findings and the plan of treatments and the final version is my approved version of the assessment.        Electronically signed by Shaun Lucero MD on 10/6/2022 at 19:40 CDT

## 2022-10-07 PROCEDURE — 25010000002 ENOXAPARIN PER 10 MG: Performed by: INTERNAL MEDICINE

## 2022-10-07 NOTE — PROGRESS NOTES
Adult Nutrition  Assessment/PES    Patient Name:  Galilea Suh  YOB: 1985  MRN: [id number]  Admit Date:  9/21/2022    Assessment Date:  10/7/2022   Reason for Assessment     Row Name 10/07/22 1527          Reason for Assessment    Reason For Assessment follow-up protocol     Diagnosis nutrition related history                Nutrition/Diet History     Row Name 10/07/22 1527          Nutrition/Diet History    Typical Intake (Food/Fluid/EN/PN) Pt continues to request 2+ entrees, additional snacks, and soda. % meals and snacks. Wt 287.1lb; loss desired and appropriate.                Labs/Tests/Procedures/Meds     Row Name 10/07/22 1527          Labs/Procedures/Meds    Lab Results Reviewed reviewed            Diagnostic Tests/Procedures    Diagnostic Test/Procedure Reviewed reviewed            Medications    Pertinent Medications Reviewed reviewed                Physical Findings     Row Name 10/07/22 1528          Physical Findings    Overall Physical Appearance NC PRN, no edema, BM 10/5, no skin breakdown.                Estimated/Assessed Needs - Anthropometrics     Row Name 10/07/22 1528          Anthropometrics    Weight for Calculation 59 kg (130 lb)  IBW            Estimated/Assessed Needs    Additional Documentation Fluid Requirements (Group);KCAL/KG (Group);Protein Requirements (Group)            KCAL/KG    KCAL/KG 25 Kcal/Kg (kcal);30 Kcal/Kg (kcal)     25 Kcal/Kg (kcal) 1474.2     30 Kcal/Kg (kcal) 1769.04            Protein Requirements    Weight Used For Protein Calculations 59 kg (130 lb)  IBW     Est Protein Requirement Amount (gms/kg) 1.8 gm protein     Estimated Protein Requirements (gms/day) 106.14            Fluid Requirements    Fluid Requirements (mL/day) 1769     RDA Method (mL) 1769                Nutrition Prescription Ordered     Row Name 10/07/22 1528          Nutrition Prescription PO    Current PO Diet Regular     Supplement Frequency Snack time                Evaluation of  Received Nutrient/Fluid Intake     Row Name 10/07/22 1528          Nutrient/Fluid Evaluation    Number of Days Evaluated 3 days            Fluid Intake Evaluation    Oral Fluid (mL) 2460            PO Evaluation    Number of Days PO Intake Evaluated 3 days     Number of Meals 4     % PO Intake 100; snack x 1 @ 100%                   Problem/Interventions:   Problem 1     Row Name 10/07/22 1529          Nutrition Diagnoses Problem 1    Problem 1 Overweight/Obesity     Etiology (related to) Other (comment)  suspected Prader-Willi     Signs/Symptoms (evidenced by) BMI     BMI Greater than 40                      Intervention Goal     Row Name 10/07/22 1529          Intervention Goal    General Meet nutritional needs for age/condition;Reduce/improve symptoms;Disease management/therapy     PO Meet estimated needs     Weight Appropriate weight loss                Nutrition Intervention     Row Name 10/07/22 1529          Nutrition Intervention    RD/Tech Action Care plan reviewd;Follow Tx progress                Nutrition Prescription     Row Name 10/07/22 1529          Nutrition Prescription PO    PO Prescription Other (comment)  continue same protocol                Education/Evaluation     Row Name 10/07/22 1529          Education    Education No discharge needs identified at this time            Monitor/Evaluation    Monitor Per protocol                 Electronically signed by:  Althea Hale RD  10/07/22 15:29 CDT

## 2022-10-07 NOTE — PROGRESS NOTES
Nic Lucero M.D.  KRISTINE Galvin        Internal Medicine Progress Note    10/7/2022   09:34 CDT    Name:  Galilea Suh  MRN:    [id number]     Acct:     [id number]   Room:  [address] [place] [address] Day: 0     Admit Date: 9/21/2022  4:28 PM  PCP: Provider, No Known    Subjective:     C/C: shortness of breath, anxiety    Interval History: Status:  stayed the same. Resting in bed. No family at bedside. Afebrile. 02 sats low 90s at rest on bipap with 30% 02.  Drops to low to mid 80s with sleep off bipap/02.  Progressing with therapies. Denies pain.  Anxious for discharge.   Review of Systems   Constitutional: Positive for malaise/fatigue. Negative for chills, decreased appetite, weight gain and weight loss.   HENT: Negative for congestion, ear discharge, hoarse voice and tinnitus.    Eyes: Negative for blurred vision, discharge, visual disturbance and visual halos.   Cardiovascular: Positive for dyspnea on exertion. Negative for chest pain, claudication, irregular heartbeat, leg swelling, orthopnea and paroxysmal nocturnal dyspnea.   Respiratory: Positive for cough and shortness of breath. Negative for sputum production and wheezing.    Endocrine: Negative for cold intolerance, heat intolerance and polyuria.   Hematologic/Lymphatic: Negative for adenopathy. Does not bruise/bleed easily.   Skin: Negative for dry skin, itching and suspicious lesions.   Musculoskeletal: Negative for arthritis, back pain, falls, joint pain, muscle weakness and myalgias.   Gastrointestinal: Negative for abdominal pain, constipation, diarrhea, dysphagia and hematemesis.   Genitourinary: Negative for bladder incontinence, dysuria and frequency.   Neurological: Positive for weakness. Negative for aphonia, disturbances in coordination and dizziness.   Psychiatric/Behavioral: Negative for altered mental status, depression, memory loss and substance abuse. The patient is nervous/anxious. The patient does not have insomnia.           Medications:     Allergies:   Allergies   Allergen Reactions   • Erythromycin Base Unknown - Low Severity       Current Meds:   Current Facility-Administered Medications:   •  acetaminophen (TYLENOL) tablet 650 mg, 650 mg, Oral, Q4H PRN **OR** acetaminophen (TYLENOL) suppository 650 mg, 650 mg, Rectal, Q4H PRN, Shaun Lucero MD  •  benzonatate (TESSALON) capsule 100 mg, 100 mg, Oral, TID PRN, Shaun Lucero MD  •  benztropine (COGENTIN) tablet 1 mg, 1 mg, Oral, TID, Shaun Lucero MD  •  cholecalciferol (VITAMIN D3) tablet 2,000 Units, 2,000 Units, Oral, Daily, Shaun Lucero MD  •  cloZAPine (CLOZARIL) tablet 100 mg, 100 mg, Oral, Daily, Shaun Lucero MD  •  divalproex (DEPAKOTE ER) 24 hr tablet 2,500 mg, 2,500 mg, Oral, Nightly, Shaun Lucero MD  •  docusate sodium (COLACE) capsule 100 mg, 100 mg, Oral, BID, Shaun Lucero MD  •  Enoxaparin Sodium (LOVENOX) syringe 40 mg, 40 mg, Subcutaneous, Q12H, Shaun Lucero MD  •  furosemide (LASIX) tablet 40 mg, 40 mg, Oral, Daily, Shaun Lucero MD  •  guaiFENesin (MUCINEX) 12 hr tablet 1,200 mg, 1,200 mg, Oral, BID PRN, Shaun Lucero MD  •  ipratropium-albuterol (DUO-NEB) nebulizer solution 3 mL, 3 mL, Nebulization, 4x Daily - RT, Shaun Lucero MD  •  nicotine (NICODERM CQ) 14 MG/24HR patch 1 patch, 1 patch, Transdermal, Q24H, Elyse Meeks APRN  •  ondansetron (ZOFRAN) injection 4 mg, 4 mg, Intravenous, Q6H PRN **OR** ondansetron (ZOFRAN) tablet 4 mg, 4 mg, Oral, Q6H PRN, Shaun Lucero MD  •  pantoprazole (PROTONIX) EC tablet 40 mg, 40 mg, Oral, Daily, Shaun Lucero MD  •  saccharomyces boulardii (FLORASTOR) capsule 250 mg, 250 mg, Oral, BID, Shaun Lucero MD  •  sodium chloride 0.9 % flush 10 mL, 10 mL, Intravenous, Q12H, Shaun Lucero MD  •  sodium chloride 0.9 % flush 10 mL, 10 mL, Intravenous, PRN, Shaun Lucero,  "MD    Data:     Code Status:    There are no questions and answers to display.       No family history on file.    Social History     Socioeconomic History   • Marital status: Single       Vitals:  Ht 167.6 cm (66\")   Wt (!) 158 kg (348 lb 11.2 oz)   BMI 56.28 kg/m²   T 97.6 P 88 R 18 /75 Sp02 95% (room air)          I/O (24Hr):  No intake or output data in the 24 hours ending 10/07/22 0934    Labs and imaging:      No results found for this or any previous visit (from the past 12 hour(s)).        Physical Examination:        Physical Exam  Vitals and nursing note reviewed.   Constitutional:       Appearance: Normal appearance. She is obese.   HENT:      Head: Normocephalic and atraumatic.      Right Ear: External ear normal.      Left Ear: External ear normal.      Nose: Nose normal.      Mouth/Throat:      Mouth: Mucous membranes are dry.      Pharynx: Oropharynx is clear.   Eyes:      Extraocular Movements: Extraocular movements intact.      Conjunctiva/sclera: Conjunctivae normal.      Pupils: Pupils are equal, round, and reactive to light.   Cardiovascular:      Rate and Rhythm: Normal rate and regular rhythm.   Abdominal:      General: Bowel sounds are normal.      Palpations: Abdomen is soft.      Comments: obese   Musculoskeletal:         General: Normal range of motion.      Cervical back: Normal range of motion and neck supple.   Skin:     General: Skin is warm and dry.   Neurological:      Mental Status: She is oriented to person, place, and time.   Psychiatric:         Mood and Affect: Mood normal.         Behavior: Behavior normal.           Assessment:            * No active hospital problems. *    No past medical history on file.     Plan:        1. Acute on chronic hypoxic/hypercapneic respiratory failure  2. Schizophrenia  3. Morbid obesity  4. GERD  5. Possible Prader-Willi syndrome  6. Suspect obesity hypoventilation syndrome  7. S/p rhinovirus  8. Tobacco abuse     Continue current " treatment. Monitor counts. Increase activity. Labs Monday. Oxygen weaning as tolerated. Encourage increased participation with therapies. Maintain patient safety. Discharge planning.   Ordering trilogy non-invasive ventilation. Bipap was considered but wound be ineffective due to persistent hypercapnia noted on BAGs. Patient has acute on chronic hypoxic/hypercapneic respiratory failure secondary to obesity hypoventilation syndrome. She required frequen duration of ventilatory support with targeted volume ventilation needed to maintain adequate C02 levels and decrease hospitalizations.      Electronically signed by KRISTINE Leiva on 10/7/2022 at 09:34 CDT

## 2022-10-08 PROCEDURE — 25010000002 ENOXAPARIN PER 10 MG: Performed by: INTERNAL MEDICINE

## 2022-10-08 RX ORDER — IPRATROPIUM BROMIDE AND ALBUTEROL SULFATE 2.5; .5 MG/3ML; MG/3ML
3 SOLUTION RESPIRATORY (INHALATION) 4 TIMES DAILY PRN
Status: DISCONTINUED | OUTPATIENT
Start: 2022-10-08 | End: 2022-10-10 | Stop reason: HOSPADM

## 2022-10-08 NOTE — PROGRESS NOTES
Nic Lucero M.D.  KRISTINE Galvin        Internal Medicine Progress Note    10/8/2022   16:02 CDT    Name:  Galilea Suh  MRN:    [id number]     Acct:     [id number]   Room:  [address] [place] [address] Day: 0     Admit Date: 9/21/2022  4:28 PM  PCP: Provider, No Known    Subjective:     C/C: shortness of breath, anxiety    Interval History: Status:  Doing well. Resting in bed. No family at bedside. Afebrile. 02 sats maintaining in the low 90s at rest on bipap with 30% 02. And continues to Drop to low to mid 80s with sleep off bipap/02.  Progressing with therapies. Denies pain.  Anxious for discharge.     Review of Systems   Constitutional: Positive for malaise/fatigue. Negative for chills, decreased appetite, weight gain and weight loss.   HENT: Negative for congestion, ear discharge, hoarse voice and tinnitus.    Eyes: Negative for blurred vision, discharge, visual disturbance and visual halos.   Cardiovascular: Positive for dyspnea on exertion. Negative for chest pain, claudication, irregular heartbeat, leg swelling, orthopnea and paroxysmal nocturnal dyspnea.   Respiratory: Positive for cough and shortness of breath. Negative for sputum production and wheezing.    Endocrine: Negative for cold intolerance, heat intolerance and polyuria.   Hematologic/Lymphatic: Negative for adenopathy. Does not bruise/bleed easily.   Skin: Negative for dry skin, itching and suspicious lesions.   Musculoskeletal: Negative for arthritis, back pain, falls, joint pain, muscle weakness and myalgias.   Gastrointestinal: Negative for abdominal pain, constipation, diarrhea, dysphagia and hematemesis.   Genitourinary: Negative for bladder incontinence, dysuria and frequency.   Neurological: Positive for weakness. Negative for aphonia, disturbances in coordination and dizziness.   Psychiatric/Behavioral: Negative for altered mental status, depression, memory loss and substance abuse. The patient is nervous/anxious. The patient does  not have insomnia.          Medications:     Allergies:   Allergies   Allergen Reactions    Erythromycin Base Unknown - Low Severity       Current Meds:   Current Facility-Administered Medications:     acetaminophen (TYLENOL) tablet 650 mg, 650 mg, Oral, Q4H PRN **OR** acetaminophen (TYLENOL) suppository 650 mg, 650 mg, Rectal, Q4H PRN, Shaun Lucero MD    benzonatate (TESSALON) capsule 100 mg, 100 mg, Oral, TID PRN, Shaun Lucero MD    benztropine (COGENTIN) tablet 1 mg, 1 mg, Oral, TID, Shaun Lucero MD    cholecalciferol (VITAMIN D3) tablet 2,000 Units, 2,000 Units, Oral, Daily, Shaun Lucero MD    cloZAPine (CLOZARIL) tablet 100 mg, 100 mg, Oral, Daily, Shaun Lucero MD    divalproex (DEPAKOTE ER) 24 hr tablet 2,500 mg, 2,500 mg, Oral, Nightly, Shaun Lucero MD    docusate sodium (COLACE) capsule 100 mg, 100 mg, Oral, BID, Shaun Lucero MD    Enoxaparin Sodium (LOVENOX) syringe 40 mg, 40 mg, Subcutaneous, Q12H, Shaun Lucero MD    furosemide (LASIX) tablet 40 mg, 40 mg, Oral, Daily, Shaun Lucero MD    guaiFENesin (MUCINEX) 12 hr tablet 1,200 mg, 1,200 mg, Oral, BID PRN, Shaun Lucero MD    ipratropium-albuterol (DUO-NEB) nebulizer solution 3 mL, 3 mL, Nebulization, 4x Daily PRN, Shaun Lucero MD    nicotine (NICODERM CQ) 14 MG/24HR patch 1 patch, 1 patch, Transdermal, Q24H, Elyse Meeks APRN    ondansetron (ZOFRAN) injection 4 mg, 4 mg, Intravenous, Q6H PRN **OR** ondansetron (ZOFRAN) tablet 4 mg, 4 mg, Oral, Q6H PRN, hSaun Lucero MD    pantoprazole (PROTONIX) EC tablet 40 mg, 40 mg, Oral, Daily, Shaun Lucero MD    saccharomyces boulardii (FLORASTOR) capsule 250 mg, 250 mg, Oral, BID, Shaun Lucero MD    sodium chloride 0.9 % flush 10 mL, 10 mL, Intravenous, Q12H, Shaun Lucero MD    sodium chloride 0.9 % flush 10 mL, 10 mL, Intravenous, PRN, Shaun Lucero,  "MD    Data:     Code Status:    There are no questions and answers to display.       No family history on file.    Social History     Socioeconomic History    Marital status: Single       Vitals:  Ht 167.6 cm (66\")   Wt (!) 158 kg (348 lb 11.2 oz)   BMI 56.28 kg/m²   T 97.7 pulse 78 respiratory 20 blood pressure 120/57 sat 94% (room air)          I/O (24Hr):  No intake or output data in the 24 hours ending 10/08/22 1602    Labs and imaging:      No results found for this or any previous visit (from the past 12 hour(s)).        Physical Examination:        Physical Exam  Vitals and nursing note reviewed.   Constitutional:       Appearance: Normal appearance. She is obese.   HENT:      Head: Normocephalic and atraumatic.      Right Ear: External ear normal.      Left Ear: External ear normal.      Nose: Nose normal.      Mouth/Throat:      Mouth: Mucous membranes are dry.      Pharynx: Oropharynx is clear.   Eyes:      Extraocular Movements: Extraocular movements intact.      Conjunctiva/sclera: Conjunctivae normal.      Pupils: Pupils are equal, round, and reactive to light.   Cardiovascular:      Rate and Rhythm: Normal rate and regular rhythm.   Abdominal:      General: Bowel sounds are normal.      Palpations: Abdomen is soft.      Comments: obese   Musculoskeletal:         General: Normal range of motion.      Cervical back: Normal range of motion and neck supple.   Skin:     General: Skin is warm and dry.   Neurological:      Mental Status: She is oriented to person, place, and time.   Psychiatric:         Mood and Affect: Mood normal.         Behavior: Behavior normal.           Assessment:            * No active hospital problems. *    No past medical history on file.     Plan:        Acute on chronic hypoxic/hypercapneic respiratory failure  Schizophrenia  Morbid obesity  GERD  Possible Prader-Willi syndrome  Suspect obesity hypoventilation syndrome  S/p rhinovirus  Tobacco abuse     Continue current " treatment. Monitor counts. Increase activity. Labs Monday. Oxygen weaning as tolerated. Encourage increased participation with therapies. Maintain patient safety. Discharge planning.   Ordering trilogy non-invasive ventilation. Bipap was considered but wound be ineffective due to persistent hypercapnia noted on BAGs. Patient has acute on chronic hypoxic/hypercapneic respiratory failure secondary to obesity hypoventilation syndrome. She required frequen duration of ventilatory support with targeted volume ventilation needed to maintain adequate C02 levels and decrease hospitalizations.      Electronically signed by Shaun Lucero MD on 10/8/2022 at 16:02 CDT

## 2022-10-09 PROCEDURE — 25010000002 ENOXAPARIN PER 10 MG: Performed by: INTERNAL MEDICINE

## 2022-10-09 NOTE — PROGRESS NOTES
Nic Lucero M.D.  KRISTINE Galvin        Internal Medicine Progress Note    10/9/2022   09:19 CDT    Name:  Galilea Suh  MRN:    [id number]     Acct:     [id number]   Room:  [address] [place] [address] Day: 0     Admit Date: 9/21/2022  4:28 PM  PCP: Provider, No Known    Subjective:     C/C: shortness of breath, anxiety    Interval History: Status:  stayed the same. Resting in bed. No family at bedside. O2 stable on RA. Just completed breakfast. Denies shortness of breath. Progressing well with therapy.     Review of Systems   Constitutional: Positive for malaise/fatigue. Negative for chills, decreased appetite, weight gain and weight loss.   HENT: Negative for congestion, ear discharge, hoarse voice and tinnitus.    Eyes: Negative for blurred vision, discharge, visual disturbance and visual halos.   Cardiovascular: Positive for dyspnea on exertion. Negative for chest pain, claudication, irregular heartbeat, leg swelling, orthopnea and paroxysmal nocturnal dyspnea.   Respiratory: Positive for cough and shortness of breath. Negative for sputum production and wheezing.    Endocrine: Negative for cold intolerance, heat intolerance and polyuria.   Hematologic/Lymphatic: Negative for adenopathy. Does not bruise/bleed easily.   Skin: Negative for dry skin, itching and suspicious lesions.   Musculoskeletal: Negative for arthritis, back pain, falls, joint pain, muscle weakness and myalgias.   Gastrointestinal: Negative for abdominal pain, constipation, diarrhea, dysphagia and hematemesis.   Genitourinary: Negative for bladder incontinence, dysuria and frequency.   Neurological: Positive for weakness. Negative for aphonia, disturbances in coordination and dizziness.   Psychiatric/Behavioral: Negative for altered mental status, depression, memory loss and substance abuse. The patient is nervous/anxious. The patient does not have insomnia.          Medications:     Allergies:   Allergies   Allergen Reactions   •  Erythromycin Base Unknown - Low Severity       Current Meds:   Current Facility-Administered Medications:   •  acetaminophen (TYLENOL) tablet 650 mg, 650 mg, Oral, Q4H PRN **OR** acetaminophen (TYLENOL) suppository 650 mg, 650 mg, Rectal, Q4H PRN, Shaun Lucero MD  •  benzonatate (TESSALON) capsule 100 mg, 100 mg, Oral, TID PRN, Shaun Lucero MD  •  benztropine (COGENTIN) tablet 1 mg, 1 mg, Oral, TID, Shaun Lucero MD  •  cholecalciferol (VITAMIN D3) tablet 2,000 Units, 2,000 Units, Oral, Daily, Shaun Lucero MD  •  cloZAPine (CLOZARIL) tablet 100 mg, 100 mg, Oral, Daily, Shaun Lucero MD  •  divalproex (DEPAKOTE ER) 24 hr tablet 2,500 mg, 2,500 mg, Oral, Nightly, Shaun Lucero MD  •  docusate sodium (COLACE) capsule 100 mg, 100 mg, Oral, BID, Shaun Lucero MD  •  Enoxaparin Sodium (LOVENOX) syringe 40 mg, 40 mg, Subcutaneous, Q12H, Shaun Lucero MD  •  furosemide (LASIX) tablet 40 mg, 40 mg, Oral, Daily, Shaun Lucero MD  •  guaiFENesin (MUCINEX) 12 hr tablet 1,200 mg, 1,200 mg, Oral, BID PRN, Shaun Lucero MD  •  ipratropium-albuterol (DUO-NEB) nebulizer solution 3 mL, 3 mL, Nebulization, 4x Daily PRN, Shaun Lucero MD  •  nicotine (NICODERM CQ) 14 MG/24HR patch 1 patch, 1 patch, Transdermal, Q24H, Elyse Meeks APRN  •  ondansetron (ZOFRAN) injection 4 mg, 4 mg, Intravenous, Q6H PRN **OR** ondansetron (ZOFRAN) tablet 4 mg, 4 mg, Oral, Q6H PRN, Shaun Lucero MD  •  pantoprazole (PROTONIX) EC tablet 40 mg, 40 mg, Oral, Daily, Shaun Lucero MD  •  saccharomyces boulardii (FLORASTOR) capsule 250 mg, 250 mg, Oral, BID, Shaun Lucero MD  •  sodium chloride 0.9 % flush 10 mL, 10 mL, Intravenous, Q12H, Shaun Lucero MD  •  sodium chloride 0.9 % flush 10 mL, 10 mL, Intravenous, PRN, Shaun Lucero MD    Data:     Code Status:    There are no questions and answers to  "display.       No family history on file.    Social History     Socioeconomic History   • Marital status: Single       Vitals:  Ht 167.6 cm (66\")   Wt (!) 158 kg (348 lb 11.2 oz)   BMI 56.28 kg/m²   T 98.1 P 92 R 14 /68 Sp02 92% (room air)          I/O (24Hr):  No intake or output data in the 24 hours ending 10/09/22 0919    Labs and imaging:      No results found for this or any previous visit (from the past 12 hour(s)).        Physical Examination:        Physical Exam  Vitals and nursing note reviewed.   Constitutional:       Appearance: Normal appearance. She is obese.   HENT:      Head: Normocephalic and atraumatic.      Right Ear: External ear normal.      Left Ear: External ear normal.      Nose: Nose normal.      Mouth/Throat:      Mouth: Mucous membranes are dry.      Pharynx: Oropharynx is clear.   Eyes:      Extraocular Movements: Extraocular movements intact.      Conjunctiva/sclera: Conjunctivae normal.      Pupils: Pupils are equal, round, and reactive to light.   Cardiovascular:      Rate and Rhythm: Normal rate and regular rhythm.   Abdominal:      General: Bowel sounds are normal.      Palpations: Abdomen is soft.      Comments: obese   Musculoskeletal:         General: Normal range of motion.      Cervical back: Normal range of motion and neck supple.   Skin:     General: Skin is warm and dry.   Neurological:      Mental Status: She is oriented to person, place, and time.   Psychiatric:         Mood and Affect: Mood normal.         Behavior: Behavior normal.           Assessment:            * No active hospital problems. *    No past medical history on file.     Plan:        1. Acute on chronic hypoxic/hypercapneic respiratory failure  2. Schizophrenia  3. Morbid obesity  4. GERD  5. Possible Prader-Willi syndrome  6. Suspect obesity hypoventilation syndrome  7. S/p rhinovirus  8. Tobacco abuse     Continue current treatment. Monitor counts. Increase activity. Labs Monday. Oxygen weaning as " tolerated. Encourage increased participation with therapies. Maintain patient safety. Discharge planning.   Ordering trilogy non-invasive ventilation. Bipap was considered but wound be ineffective due to persistent hypercapnia noted on BAGs. Patient has acute on chronic hypoxic/hypercapneic respiratory failure secondary to obesity hypoventilation syndrome. She required frequen duration of ventilatory support with targeted volume ventilation needed to maintain adequate C02 levels and decrease hospitalizations.      Electronically signed by KRISTINE Alfred on 10/9/2022 at 09:19 CDT

## 2022-10-10 VITALS — WEIGHT: 293 LBS | BODY MASS INDEX: 47.09 KG/M2 | HEIGHT: 66 IN

## 2022-10-10 LAB
ANION GAP SERPL CALCULATED.3IONS-SCNC: 8 MMOL/L (ref 5–15)
BASOPHILS # BLD AUTO: 0.04 10*3/MM3 (ref 0–0.2)
BASOPHILS NFR BLD AUTO: 0.5 % (ref 0–1.5)
BUN SERPL-MCNC: 10 MG/DL (ref 6–20)
BUN/CREAT SERPL: 15.6 (ref 7–25)
CALCIUM SPEC-SCNC: 9 MG/DL (ref 8.6–10.5)
CHLORIDE SERPL-SCNC: 99 MMOL/L (ref 98–107)
CO2 SERPL-SCNC: 33 MMOL/L (ref 22–29)
CREAT SERPL-MCNC: 0.64 MG/DL (ref 0.57–1)
DEPRECATED RDW RBC AUTO: 46.7 FL (ref 37–54)
EGFRCR SERPLBLD CKD-EPI 2021: 118.4 ML/MIN/1.73
EOSINOPHIL # BLD AUTO: 0.1 10*3/MM3 (ref 0–0.4)
EOSINOPHIL NFR BLD AUTO: 1.2 % (ref 0.3–6.2)
ERYTHROCYTE [DISTWIDTH] IN BLOOD BY AUTOMATED COUNT: 13.8 % (ref 12.3–15.4)
GLUCOSE SERPL-MCNC: 132 MG/DL (ref 65–99)
HCT VFR BLD AUTO: 36.3 % (ref 34–46.6)
HGB BLD-MCNC: 11.6 G/DL (ref 12–15.9)
IMM GRANULOCYTES # BLD AUTO: 0.06 10*3/MM3 (ref 0–0.05)
IMM GRANULOCYTES NFR BLD AUTO: 0.7 % (ref 0–0.5)
LYMPHOCYTES # BLD AUTO: 2.34 10*3/MM3 (ref 0.7–3.1)
LYMPHOCYTES NFR BLD AUTO: 28 % (ref 19.6–45.3)
MCH RBC QN AUTO: 29.5 PG (ref 26.6–33)
MCHC RBC AUTO-ENTMCNC: 32 G/DL (ref 31.5–35.7)
MCV RBC AUTO: 92.4 FL (ref 79–97)
MONOCYTES # BLD AUTO: 0.98 10*3/MM3 (ref 0.1–0.9)
MONOCYTES NFR BLD AUTO: 11.7 % (ref 5–12)
NEUTROPHILS NFR BLD AUTO: 4.83 10*3/MM3 (ref 1.7–7)
NEUTROPHILS NFR BLD AUTO: 57.9 % (ref 42.7–76)
NRBC BLD AUTO-RTO: 0 /100 WBC (ref 0–0.2)
NT-PROBNP SERPL-MCNC: 16.9 PG/ML (ref 0–450)
PLATELET # BLD AUTO: 303 10*3/MM3 (ref 140–450)
PMV BLD AUTO: 8.8 FL (ref 6–12)
POTASSIUM SERPL-SCNC: 3.9 MMOL/L (ref 3.5–5.2)
RBC # BLD AUTO: 3.93 10*6/MM3 (ref 3.77–5.28)
SODIUM SERPL-SCNC: 140 MMOL/L (ref 136–145)
WBC NRBC COR # BLD: 8.35 10*3/MM3 (ref 3.4–10.8)

## 2022-10-10 PROCEDURE — 25010000002 ENOXAPARIN PER 10 MG: Performed by: INTERNAL MEDICINE

## 2022-10-10 PROCEDURE — 83880 ASSAY OF NATRIURETIC PEPTIDE: CPT | Performed by: INTERNAL MEDICINE

## 2022-10-10 PROCEDURE — 80048 BASIC METABOLIC PNL TOTAL CA: CPT | Performed by: INTERNAL MEDICINE

## 2022-10-10 PROCEDURE — 85025 COMPLETE CBC W/AUTO DIFF WBC: CPT | Performed by: INTERNAL MEDICINE

## 2022-10-10 NOTE — DISCHARGE SUMMARY
THOMAS Rao APRN      Internal Medicine Discharge Summary    Patient ID: Galilea Suh  MRN: [id number]     Acct:  [id number]       Patient's PCP: Provider, No Known    Admit Date: 9/21/2022     Discharge Date:   10/10/2022    Admitting Physician: Shaun Lucero MD    Discharge Physician: KRISTINE Alfred     Active Discharge Diagnoses:    Acute on chronic hypoxic/hypercapneic respiratory failure  Schizophrenia  Morbid obesity  GERD  Possible Prader-Willi syndrome  Suspect obesity hypoventilation syndrome  S/p rhinovirus  Tobacco abuse    Primary Problem  <principal problem not specified>      Hospital Problems    * No active hospital problems. *   No past medical history on file.    The patient was seen and examined on the day of discharge and this discharge summary is in conjunction with any daily progress note from day of discharge.    Code Status:    There are no questions and answers to display.       Hospital Course:   Galilea Suh is a  37 y.o.  female who presented with need for continued oxygen weaning and rehabilitation efforts following a recent acute care stay. The patient had been in her usual state of health at [facility] where she resides when she developed increased shortness of breath with cough and altered mental status. She presented to ER with her complaints and was found to have 02 sats in the 80s on room air. She was found positive for rhinovirus. She was treated with supplemental oxygen, IV steroids and nebulizer treatments. She had a continued decline as well as an episode of emesis causing concern for possible aspiration pneumonia. There is concern for Prader-Willi syndrome, but chart review indicates that patient has never been evaluated for this. She had continued increase in oxygen demand and was treated with IV antibiotics for possible bacterial infection, but cultures remained negative. Patient stabilized, but due to her need  for continued oxygen weaning, she transferred to our facility. During her stay she was able to wean from O2 per NC to room air.  She is using CPAP at night. She made good progress with therapy and is at her baseline. Ms. Suh will be sent to her home at [facility] with trilogy. . Bipap was considered but wound be ineffective due to persistent hypercapnia noted on BAGs. Patient has acute on chronic hypoxic/hypercapneic respiratory failure secondary to obesity hypoventilation syndrome. She required frequen duration of ventilatory support with targeted volume ventilation needed to maintain adequate C02 levels and decrease hospitalizations.    Review of Systems   Constitutional: Positive for malaise/fatigue. Negative for chills, decreased appetite, weight gain and weight loss.   HENT: Negative for congestion, ear discharge, hoarse voice and tinnitus.    Eyes: Negative for blurred vision, discharge, visual disturbance and visual halos.   Cardiovascular: Positive for dyspnea on exertion. Negative for chest pain, claudication, irregular heartbeat, leg swelling, orthopnea and paroxysmal nocturnal dyspnea.   Respiratory:  Negative for sputum production and wheezing.    Endocrine: Negative for cold intolerance, heat intolerance and polyuria.   Hematologic/Lymphatic: Negative for adenopathy. Does not bruise/bleed easily.   Skin: Negative for dry skin, itching and suspicious lesions.   Musculoskeletal: Negative for arthritis, back pain, falls, joint pain, muscle weakness and myalgias.   Gastrointestinal: Negative for abdominal pain, constipation, diarrhea, dysphagia and hematemesis.   Genitourinary: Negative for bladder incontinence, dysuria and frequency.   Neurological: Positive for weakness. Negative for aphonia, disturbances in coordination and dizziness.   Psychiatric/Behavioral: Negative for altered mental status, depression, memory loss and substance abuse. The patient is nervous/anxious. The  patient does not have insomnia.      Physical Exam  Vitals and nursing note reviewed.   Constitutional:       Appearance: Normal appearance. She is obese.   HENT:      Head: Normocephalic and atraumatic.      Right Ear: External ear normal.      Left Ear: External ear normal.      Nose: Nose normal.      Mouth/Throat:      Mouth: Mucous membranes are dry.      Pharynx: Oropharynx is clear.   Eyes:      Extraocular Movements: Extraocular movements intact.      Conjunctiva/sclera: Conjunctivae normal.      Pupils: Pupils are equal, round, and reactive to light.   Cardiovascular:      Rate and Rhythm: Normal rate and regular rhythm.   Abdominal:      General: Bowel sounds are normal.      Palpations: Abdomen is soft.      Comments: obese   Musculoskeletal:         General: Normal range of motion.      Cervical back: Normal range of motion and neck supple.   Skin:     General: Skin is warm and dry.   Neurological:      Mental Status: She is oriented to person, place, and time.   Psychiatric:         Mood and Affect: Mood normal.         Behavior: Behavior normal.     T 98.1 P 90 RR 16 /72 SPO2 94% (room air)     Consults:      Disposition:  [facility]     Discharged Condition: Stable    Follow Up: No follow-up provider specified.    Diet: Diet Regular; Thin    Discharge Medications:      Discharge Medications      Patient Not Prescribed Medications Upon Discharge         Time Spent on discharge is  32 minutes in patient examination, evaluation, patient/family counseling as well as medication reconciliation, prescriptions for required medications, discharge plan and follow up.     Electronically signed by KRISTINE Alfred on 10/10/2022 at 08:22 CDT     I have discussed the care of Galilea Suh, including pertinent history and exam findings, with the nurse practitioner.    I have seen and examined the patient and the key elements of all parts of the encounter have been performed by me.  I  agree with the assessment, plan and orders as documented by KRISTINE Freedman, after I modified the exam findings and the plan of treatments and the final version is my approved version of the assessment.        Electronically signed by Shaun Lucero MD on 10/10/2022 at 19:41 CDT